# Patient Record
Sex: FEMALE | Race: WHITE | Employment: OTHER | URBAN - METROPOLITAN AREA
[De-identification: names, ages, dates, MRNs, and addresses within clinical notes are randomized per-mention and may not be internally consistent; named-entity substitution may affect disease eponyms.]

---

## 2023-12-12 ENCOUNTER — OFFICE VISIT (OUTPATIENT)
Dept: OBSTETRICS AND GYNECOLOGY | Facility: CLINIC | Age: 68
End: 2023-12-12
Payer: MEDICARE

## 2023-12-12 VITALS
WEIGHT: 239 LBS | SYSTOLIC BLOOD PRESSURE: 134 MMHG | DIASTOLIC BLOOD PRESSURE: 84 MMHG | BODY MASS INDEX: 43.98 KG/M2 | HEIGHT: 62 IN

## 2023-12-12 DIAGNOSIS — N95.0 POSTMENOPAUSAL BLEEDING: Primary | ICD-10-CM

## 2023-12-12 DIAGNOSIS — N81.2 INCOMPLETE UTEROVAGINAL PROLAPSE: ICD-10-CM

## 2023-12-12 PROCEDURE — 1126F AMNT PAIN NOTED NONE PRSNT: CPT | Performed by: OBSTETRICS & GYNECOLOGY

## 2023-12-12 PROCEDURE — 1036F TOBACCO NON-USER: CPT | Performed by: OBSTETRICS & GYNECOLOGY

## 2023-12-12 PROCEDURE — 1159F MED LIST DOCD IN RCRD: CPT | Performed by: OBSTETRICS & GYNECOLOGY

## 2023-12-12 PROCEDURE — 88305 TISSUE EXAM BY PATHOLOGIST: CPT | Performed by: PATHOLOGY

## 2023-12-12 PROCEDURE — 88305 TISSUE EXAM BY PATHOLOGIST: CPT

## 2023-12-12 PROCEDURE — 99213 OFFICE O/P EST LOW 20 MIN: CPT | Performed by: OBSTETRICS & GYNECOLOGY

## 2023-12-12 RX ORDER — MUPIROCIN 20 MG/G
OINTMENT TOPICAL
COMMUNITY
Start: 2021-10-05

## 2023-12-12 RX ORDER — LORATADINE 10 MG/1
TABLET ORAL
COMMUNITY
Start: 2020-11-18

## 2023-12-12 RX ORDER — MELOXICAM 15 MG/1
15 TABLET ORAL DAILY
COMMUNITY
Start: 2023-11-07

## 2023-12-12 RX ORDER — METOPROLOL TARTRATE 50 MG/1
50 TABLET ORAL 2 TIMES DAILY
COMMUNITY
Start: 2017-01-19

## 2023-12-12 RX ORDER — CITALOPRAM 20 MG/1
TABLET, FILM COATED ORAL
COMMUNITY
Start: 2017-01-19

## 2023-12-12 RX ORDER — TRAMADOL HYDROCHLORIDE 50 MG/1
TABLET ORAL
COMMUNITY
Start: 2021-06-14

## 2023-12-12 RX ORDER — FERROUS SULFATE 325(65) MG
325 TABLET, DELAYED RELEASE (ENTERIC COATED) ORAL
COMMUNITY
Start: 2020-11-18

## 2023-12-12 RX ORDER — ACETAMINOPHEN 500 MG
TABLET ORAL
COMMUNITY

## 2023-12-12 RX ORDER — LOSARTAN POTASSIUM 50 MG/1
TABLET ORAL
COMMUNITY
Start: 2021-11-22

## 2023-12-12 RX ORDER — HYDROCHLOROTHIAZIDE 25 MG/1
TABLET ORAL
COMMUNITY
Start: 2014-08-26

## 2023-12-12 ASSESSMENT — PAIN SCALES - GENERAL: PAINLEVEL: 0-NO PAIN

## 2023-12-12 NOTE — PROGRESS NOTES
"Assessment   IMPRESSIONS:    1. Postmenopausal bleeding  - discussed possible etiologies including endometrial hyperplasia/cancer, endometrial polyp, irritation on vulva/prolapse.  -small polyp noted protruding from cervix on exam which was removed and sent for pathology. May be the cause of the bleeding  - recommend pelvic ultrasound. If endometrial thickness is >4mm then return for EMB  - if thin endometrial stripe then can monitor symptoms at home and return if bleeding recurs    2. Incomplete uterovaginal prolapse  - not bothersome to patient at this time    Follow up for preventative visit or PRN    Rossy Simons MD      Subjective   Elsa Cabrera is a 68 y.o. female here for PMB    D&C ~ 3 years ago for postmenopausal bleeding.   No bleeding since that time until about 3  weeks ago.  Small amount of spotting.   Denies abdominal pain  Denies vaginal irritation             Has known apical prolapse    Past medical history, surgical history, social history, family history, medications and allergies were reviewed and edited as needed      Objective   /84   Ht 1.575 m (5' 2\")   Wt 108 kg (239 lb)   BMI 43.71 kg/m²      General:   Alert and oriented, in no acute distress               Vulva: Normal architecture without erythema, masses, or lesions.    Vagina: Normal mucosa without lesions, masses, or atrophy. No abnormal vaginal discharge.    Cervix: Cervix protrudes about 2cm past introitus with valsalva, small polyp like lesion noted protruding from external cervical os which was removed with ring forceps   Uterus: Normal mobile, non-enlarged uterus    Adnexa: Normal without masses or lesions   Pelvic Floor Apical prolapse with cervix 2cm past introitus   Psych Normal affect. Normal mood.        " n/a

## 2023-12-21 LAB
LABORATORY COMMENT REPORT: NORMAL
PATH REPORT.FINAL DX SPEC: NORMAL
PATH REPORT.GROSS SPEC: NORMAL
PATH REPORT.RELEVANT HX SPEC: NORMAL
PATH REPORT.TOTAL CANCER: NORMAL

## 2023-12-26 DIAGNOSIS — N95.0 POSTMENOPAUSAL BLEEDING: Primary | ICD-10-CM

## 2024-01-05 ENCOUNTER — ANCILLARY PROCEDURE (OUTPATIENT)
Dept: RADIOLOGY | Facility: CLINIC | Age: 69
End: 2024-01-05
Payer: MEDICARE

## 2024-01-05 DIAGNOSIS — N95.0 POSTMENOPAUSAL BLEEDING: ICD-10-CM

## 2024-01-05 PROCEDURE — 76830 TRANSVAGINAL US NON-OB: CPT | Performed by: RADIOLOGY

## 2024-01-05 PROCEDURE — 76856 US EXAM PELVIC COMPLETE: CPT | Performed by: RADIOLOGY

## 2024-01-05 PROCEDURE — 76830 TRANSVAGINAL US NON-OB: CPT

## 2024-01-08 ENCOUNTER — TELEPHONE (OUTPATIENT)
Dept: OBSTETRICS AND GYNECOLOGY | Facility: CLINIC | Age: 69
End: 2024-01-08
Payer: MEDICARE

## 2024-01-08 NOTE — TELEPHONE ENCOUNTER
Attempted to call patient about ultrasound results. No answer. No voicemail. Will try again at a later date.

## 2024-01-09 ENCOUNTER — PREP FOR PROCEDURE (OUTPATIENT)
Dept: OBSTETRICS AND GYNECOLOGY | Facility: CLINIC | Age: 69
End: 2024-01-09
Payer: MEDICARE

## 2024-01-09 DIAGNOSIS — R93.89 THICKENED ENDOMETRIUM: Primary | ICD-10-CM

## 2024-01-09 NOTE — PROGRESS NOTES
Called patient about ultrasound results showing thickened endometrial. She had a piece of polyp protruding through her cervical os that was removed at her last visit. Discussed that the polyp may have extended higher into her uterus. I recommend a hysteroscopy D&C with polypectomy if needed. Discussed R/B of the procedure and she desires to proceed. Type and screen and CBC ordered for pre-op.

## 2024-01-22 PROBLEM — R93.89 THICKENED ENDOMETRIUM: Status: ACTIVE | Noted: 2024-01-09

## 2024-02-01 RX ORDER — ASCORBIC ACID 500 MG
500 TABLET ORAL DAILY
COMMUNITY

## 2024-02-01 RX ORDER — AMLODIPINE BESYLATE 5 MG/1
5 TABLET ORAL DAILY
COMMUNITY

## 2024-02-01 RX ORDER — BISMUTH SUBSALICYLATE 262 MG
1 TABLET,CHEWABLE ORAL DAILY
COMMUNITY

## 2024-03-18 ENCOUNTER — TELEPHONE (OUTPATIENT)
Dept: OBSTETRICS AND GYNECOLOGY | Facility: CLINIC | Age: 69
End: 2024-03-18
Payer: MEDICARE

## 2024-04-29 ENCOUNTER — ANESTHESIA EVENT (OUTPATIENT)
Dept: OPERATING ROOM | Facility: CLINIC | Age: 69
End: 2024-04-29
Payer: MEDICARE

## 2024-04-29 ENCOUNTER — ANESTHESIA (OUTPATIENT)
Dept: OPERATING ROOM | Facility: CLINIC | Age: 69
End: 2024-04-29
Payer: MEDICARE

## 2024-04-29 ENCOUNTER — HOSPITAL ENCOUNTER (OUTPATIENT)
Facility: CLINIC | Age: 69
Setting detail: OUTPATIENT SURGERY
Discharge: HOME | End: 2024-04-29
Attending: OBSTETRICS & GYNECOLOGY | Admitting: OBSTETRICS & GYNECOLOGY
Payer: MEDICARE

## 2024-04-29 VITALS
BODY MASS INDEX: 42.84 KG/M2 | DIASTOLIC BLOOD PRESSURE: 69 MMHG | SYSTOLIC BLOOD PRESSURE: 169 MMHG | OXYGEN SATURATION: 95 % | HEIGHT: 62 IN | WEIGHT: 232.81 LBS | TEMPERATURE: 97.3 F | HEART RATE: 73 BPM | RESPIRATION RATE: 16 BRPM

## 2024-04-29 DIAGNOSIS — R93.89 THICKENED ENDOMETRIUM: Primary | ICD-10-CM

## 2024-04-29 PROCEDURE — 2500000004 HC RX 250 GENERAL PHARMACY W/ HCPCS (ALT 636 FOR OP/ED): Mod: MUE | Performed by: OBSTETRICS & GYNECOLOGY

## 2024-04-29 PROCEDURE — 58555 HYSTEROSCOPY DX SEP PROC: CPT | Performed by: OBSTETRICS & GYNECOLOGY

## 2024-04-29 PROCEDURE — 3700000002 HC GENERAL ANESTHESIA TIME - EACH INCREMENTAL 1 MINUTE: Performed by: OBSTETRICS & GYNECOLOGY

## 2024-04-29 PROCEDURE — 3700000001 HC GENERAL ANESTHESIA TIME - INITIAL BASE CHARGE: Performed by: OBSTETRICS & GYNECOLOGY

## 2024-04-29 PROCEDURE — 7100000010 HC PHASE TWO TIME - EACH INCREMENTAL 1 MINUTE: Performed by: OBSTETRICS & GYNECOLOGY

## 2024-04-29 PROCEDURE — 3600000003 HC OR TIME - INITIAL BASE CHARGE - PROCEDURE LEVEL THREE: Performed by: OBSTETRICS & GYNECOLOGY

## 2024-04-29 PROCEDURE — 3600000008 HC OR TIME - EACH INCREMENTAL 1 MINUTE - PROCEDURE LEVEL THREE: Performed by: OBSTETRICS & GYNECOLOGY

## 2024-04-29 PROCEDURE — 2720000007 HC OR 272 NO HCPCS: Performed by: OBSTETRICS & GYNECOLOGY

## 2024-04-29 PROCEDURE — A4217 STERILE WATER/SALINE, 500 ML: HCPCS | Mod: MUE | Performed by: OBSTETRICS & GYNECOLOGY

## 2024-04-29 PROCEDURE — 2500000004 HC RX 250 GENERAL PHARMACY W/ HCPCS (ALT 636 FOR OP/ED): Performed by: NURSE ANESTHETIST, CERTIFIED REGISTERED

## 2024-04-29 PROCEDURE — 88305 TISSUE EXAM BY PATHOLOGIST: CPT | Mod: TC,ELYLAB | Performed by: OBSTETRICS & GYNECOLOGY

## 2024-04-29 PROCEDURE — 2500000001 HC RX 250 WO HCPCS SELF ADMINISTERED DRUGS (ALT 637 FOR MEDICARE OP): Performed by: OBSTETRICS & GYNECOLOGY

## 2024-04-29 PROCEDURE — A58558 PR HYSTEROSCOPY,W/ENDO BX: Performed by: ANESTHESIOLOGY

## 2024-04-29 PROCEDURE — 88305 TISSUE EXAM BY PATHOLOGIST: CPT | Performed by: PATHOLOGY

## 2024-04-29 PROCEDURE — A58558 PR HYSTEROSCOPY,W/ENDO BX: Performed by: NURSE ANESTHETIST, CERTIFIED REGISTERED

## 2024-04-29 PROCEDURE — 2500000005 HC RX 250 GENERAL PHARMACY W/O HCPCS: Performed by: NURSE ANESTHETIST, CERTIFIED REGISTERED

## 2024-04-29 PROCEDURE — 7100000009 HC PHASE TWO TIME - INITIAL BASE CHARGE: Performed by: OBSTETRICS & GYNECOLOGY

## 2024-04-29 RX ORDER — SODIUM CHLORIDE 0.9 G/100ML
IRRIGANT IRRIGATION AS NEEDED
Status: DISCONTINUED | OUTPATIENT
Start: 2024-04-29 | End: 2024-04-29 | Stop reason: HOSPADM

## 2024-04-29 RX ORDER — ONDANSETRON HYDROCHLORIDE 2 MG/ML
INJECTION, SOLUTION INTRAVENOUS AS NEEDED
Status: DISCONTINUED | OUTPATIENT
Start: 2024-04-29 | End: 2024-04-29

## 2024-04-29 RX ORDER — LIDOCAINE HYDROCHLORIDE 20 MG/ML
INJECTION, SOLUTION INFILTRATION; PERINEURAL AS NEEDED
Status: DISCONTINUED | OUTPATIENT
Start: 2024-04-29 | End: 2024-04-29

## 2024-04-29 RX ORDER — OXYCODONE HYDROCHLORIDE 5 MG/1
5 TABLET ORAL EVERY 4 HOURS PRN
Status: DISCONTINUED | OUTPATIENT
Start: 2024-04-29 | End: 2024-04-29 | Stop reason: HOSPADM

## 2024-04-29 RX ORDER — PROPOFOL 10 MG/ML
INJECTION, EMULSION INTRAVENOUS AS NEEDED
Status: DISCONTINUED | OUTPATIENT
Start: 2024-04-29 | End: 2024-04-29

## 2024-04-29 RX ORDER — FENTANYL CITRATE 50 UG/ML
INJECTION, SOLUTION INTRAMUSCULAR; INTRAVENOUS AS NEEDED
Status: DISCONTINUED | OUTPATIENT
Start: 2024-04-29 | End: 2024-04-29

## 2024-04-29 RX ORDER — PROPOFOL 10 MG/ML
INJECTION, EMULSION INTRAVENOUS CONTINUOUS PRN
Status: DISCONTINUED | OUTPATIENT
Start: 2024-04-29 | End: 2024-04-29

## 2024-04-29 RX ORDER — ONDANSETRON HYDROCHLORIDE 2 MG/ML
4 INJECTION, SOLUTION INTRAVENOUS ONCE AS NEEDED
Status: DISCONTINUED | OUTPATIENT
Start: 2024-04-29 | End: 2024-04-29 | Stop reason: HOSPADM

## 2024-04-29 RX ORDER — SODIUM CHLORIDE, SODIUM LACTATE, POTASSIUM CHLORIDE, CALCIUM CHLORIDE 600; 310; 30; 20 MG/100ML; MG/100ML; MG/100ML; MG/100ML
INJECTION, SOLUTION INTRAVENOUS CONTINUOUS PRN
Status: DISCONTINUED | OUTPATIENT
Start: 2024-04-29 | End: 2024-04-29

## 2024-04-29 RX ORDER — LIDOCAINE IN NACL,ISO-OSMOT/PF 30 MG/3 ML
0.1 SYRINGE (ML) INJECTION ONCE
Status: DISCONTINUED | OUTPATIENT
Start: 2024-04-29 | End: 2024-04-29 | Stop reason: HOSPADM

## 2024-04-29 RX ORDER — HYDROMORPHONE HYDROCHLORIDE 1 MG/ML
0.5 INJECTION, SOLUTION INTRAMUSCULAR; INTRAVENOUS; SUBCUTANEOUS EVERY 5 MIN PRN
Status: DISCONTINUED | OUTPATIENT
Start: 2024-04-29 | End: 2024-04-29 | Stop reason: HOSPADM

## 2024-04-29 RX ORDER — GLYCOPYRROLATE 0.2 MG/ML
INJECTION INTRAMUSCULAR; INTRAVENOUS AS NEEDED
Status: DISCONTINUED | OUTPATIENT
Start: 2024-04-29 | End: 2024-04-29

## 2024-04-29 RX ORDER — SODIUM CHLORIDE, SODIUM LACTATE, POTASSIUM CHLORIDE, CALCIUM CHLORIDE 600; 310; 30; 20 MG/100ML; MG/100ML; MG/100ML; MG/100ML
100 INJECTION, SOLUTION INTRAVENOUS CONTINUOUS
Status: DISCONTINUED | OUTPATIENT
Start: 2024-04-29 | End: 2024-04-29 | Stop reason: HOSPADM

## 2024-04-29 RX ORDER — ALBUTEROL SULFATE 0.83 MG/ML
2.5 SOLUTION RESPIRATORY (INHALATION) ONCE AS NEEDED
Status: DISCONTINUED | OUTPATIENT
Start: 2024-04-29 | End: 2024-04-29 | Stop reason: HOSPADM

## 2024-04-29 RX ORDER — LABETALOL HYDROCHLORIDE 5 MG/ML
5 INJECTION, SOLUTION INTRAVENOUS ONCE AS NEEDED
Status: DISCONTINUED | OUTPATIENT
Start: 2024-04-29 | End: 2024-04-29 | Stop reason: HOSPADM

## 2024-04-29 RX ADMIN — FENTANYL CITRATE 25 MCG: 50 INJECTION, SOLUTION INTRAMUSCULAR; INTRAVENOUS at 13:53

## 2024-04-29 RX ADMIN — PROPOFOL 40 MG: 10 INJECTION, EMULSION INTRAVENOUS at 13:52

## 2024-04-29 RX ADMIN — PROPOFOL 200 MCG/KG/MIN: 10 INJECTION, EMULSION INTRAVENOUS at 13:53

## 2024-04-29 RX ADMIN — SODIUM CHLORIDE, POTASSIUM CHLORIDE, SODIUM LACTATE AND CALCIUM CHLORIDE: 600; 310; 30; 20 INJECTION, SOLUTION INTRAVENOUS at 13:42

## 2024-04-29 RX ADMIN — GLYCOPYRROLATE 0.2 MG: 0.2 INJECTION INTRAMUSCULAR; INTRAVENOUS at 14:19

## 2024-04-29 RX ADMIN — FENTANYL CITRATE 25 MCG: 50 INJECTION, SOLUTION INTRAMUSCULAR; INTRAVENOUS at 14:19

## 2024-04-29 RX ADMIN — ONDANSETRON 4 MG: 2 INJECTION INTRAMUSCULAR; INTRAVENOUS at 13:59

## 2024-04-29 RX ADMIN — GLYCOPYRROLATE 0.2 MG: 0.2 INJECTION INTRAMUSCULAR; INTRAVENOUS at 14:18

## 2024-04-29 RX ADMIN — LIDOCAINE HYDROCHLORIDE 80 MG: 20 INJECTION, SOLUTION INFILTRATION; PERINEURAL at 13:52

## 2024-04-29 SDOH — HEALTH STABILITY: MENTAL HEALTH: CURRENT SMOKER: 0

## 2024-04-29 ASSESSMENT — PAIN - FUNCTIONAL ASSESSMENT
PAIN_FUNCTIONAL_ASSESSMENT: 0-10

## 2024-04-29 ASSESSMENT — PAIN SCALES - GENERAL
PAINLEVEL_OUTOF10: 0 - NO PAIN
PAIN_LEVEL: 0
PAINLEVEL_OUTOF10: 3

## 2024-04-29 ASSESSMENT — COLUMBIA-SUICIDE SEVERITY RATING SCALE - C-SSRS
1. IN THE PAST MONTH, HAVE YOU WISHED YOU WERE DEAD OR WISHED YOU COULD GO TO SLEEP AND NOT WAKE UP?: NO
6. HAVE YOU EVER DONE ANYTHING, STARTED TO DO ANYTHING, OR PREPARED TO DO ANYTHING TO END YOUR LIFE?: NO
2. HAVE YOU ACTUALLY HAD ANY THOUGHTS OF KILLING YOURSELF?: NO

## 2024-04-29 NOTE — ANESTHESIA PREPROCEDURE EVALUATION
"Patient: Elsa Cabrera    Procedure Information       Date/Time: 04/29/24 1330    Procedure: Hysteroscopy    [place] [facility] [place] [facility]    Surgeons: Rossy Simons MD           68 y.o. female presenting with scheduled hysteroscopy D&C with possible polypectomy for postmenopausal bleeding.          Past Medical History  Medical History        Past Medical History:   Diagnosis Date    Breast cancer (Multi) 2007     left side-radiation/chemo    Hypertension      Nephrolithiasis      SVT (supraventricular tachycardia) (CMS-HCC)       ablation done and corrected per pt.            Clinical information reviewed:   Tobacco  Allergies  Meds   Med Hx  Surg Hx  OB Status  Fam Hx  Soc   Hx        NPO Detail:  No data recorded     Vitals:    04/29/24 1230   BP: 130/80   Pulse: 55   Resp: 16   Temp: 36.2 °C (97.2 °F)   SpO2: 97%       Past Surgical History:   Procedure Laterality Date    APPENDECTOMY      BREAST LUMPECTOMY Left     OTHER SURGICAL HISTORY  10/06/2021    Abscess incision and drainage-chest area per pt. \"several areas\"     Past Medical History:   Diagnosis Date    Breast cancer (Multi) 2007    left side-radiation/chemo    Hypertension     Nephrolithiasis     SVT (supraventricular tachycardia) (CMS-HCC)     ablation done and corrected per pt.     No current facility-administered medications for this encounter.  Allergies   Allergen Reactions    Adenosine Unknown     PATIENT STATES DRUG INEFFECTIVE IN EMERGENCY PER DR ERICKSON    Latex      Added based on information entered during log entry, please review and add reactions, type, and severity as needed     Social History     Tobacco Use    Smoking status: Never    Smokeless tobacco: Never   Substance Use Topics    Alcohol use: Never             NPO Detail:  No data recorded     Review of Systems    Physical Exam    Airway  Mallampati: III  TM distance: >3 FB  Neck ROM: full     Cardiovascular   Rhythm: regular  Rate: normal   "   Dental    Pulmonary - normal exam     Abdominal   (+) obese             Anesthesia Plan    History of general anesthesia?: yes  History of complications of general anesthesia?: no    ASA 2     MAC   (GA-TIVA)  The patient is not a current smoker.    intravenous induction   Anesthetic plan and risks discussed with patient.    Plan discussed with CRNA and attending.

## 2024-04-29 NOTE — DISCHARGE INSTRUCTIONS
Please follow up in 2 weeks for post op visit    May have Tylenol after: 7:15pm    TO REACH YOUR PHYSICIAN AFTER HOURS CALL  AND ASK FOR THE PHYSICIAN ON CALL    Dr. Simons  [phone] Scheduling  [phone] Office

## 2024-04-29 NOTE — ANESTHESIA POSTPROCEDURE EVALUATION
Patient: Elsa Cabrera    Procedure Summary       Date: 04/29/24 Room / Location: [facility] OR 01 / [facility]    Anesthesia Start: 1344 Anesthesia Stop: 1432    Procedure: Hysteroscopy Diagnosis:       Thickened endometrium      (Thickened endometrium [R93.89])    Surgeons: Rossy Simons MD Responsible Provider: Vikki Rivera MD    Anesthesia Type: MAC ASA Status: 2            Anesthesia Type: MAC    Vitals Value Taken Time   /77 04/29/24 1432   Temp 36 °C (96.8 °F) 04/29/24 1432   Pulse 75 04/29/24 1432   Resp 16 04/29/24 1432   SpO2 94 % 04/29/24 1432       Anesthesia Post Evaluation    Patient location during evaluation: PACU  Patient participation: complete - patient participated  Level of consciousness: awake and alert  Pain score: 0  Pain management: adequate  Airway patency: patent  Cardiovascular status: acceptable  Respiratory status: acceptable  Hydration status: acceptable  Postoperative Nausea and Vomiting: none        There were no known notable events for this encounter.

## 2024-04-29 NOTE — H&P
"History Of Present Illness  Elsa Cabrera is a 68 y.o. female presenting with scheduled hysteroscopy D&C with possible polypectomy for postmenopausal bleeding.    No new concerns today.     Past Medical History  Past Medical History:   Diagnosis Date    Breast cancer (Multi) 2007    left side-radiation/chemo    Hypertension     Nephrolithiasis     SVT (supraventricular tachycardia) (CMS-HCC)     ablation done and corrected per pt.       Surgical History  Past Surgical History:   Procedure Laterality Date    APPENDECTOMY      BREAST LUMPECTOMY Left     OTHER SURGICAL HISTORY  10/06/2021    Abscess incision and drainage-chest area per pt. \"several areas\"        Social History  She reports that she has never smoked. She has never used smokeless tobacco. She reports that she does not drink alcohol and does not use drugs.    Family History  No family history on file.     Allergies  Adenosine and Latex    Review of Systems     Physical Exam   Objective   /80   Pulse 55   Temp 36.2 °C (97.2 °F) (Temporal)   Resp 16   Ht 1.575 m (5' 2\")   Wt 106 kg (232 lb 12.9 oz)   SpO2 97%   BMI 42.58 kg/m²      General:   Alert and oriented, in no acute distress   Neck: Supple. No visible thyromegaly.    Chest RRR  CTAB   Abdomen: Soft, non-tender, without masses or organomegaly   Vulva:    Vagina:    Cervix:    Uterus:    Adnexa:    Pelvic Floor    Psych Normal affect. Normal mood.        Last Recorded Vitals  Blood pressure 130/80, pulse 55, temperature 36.2 °C (97.2 °F), temperature source Temporal, resp. rate 16, height 1.575 m (5' 2\"), weight 106 kg (232 lb 12.9 oz), SpO2 97%.    Relevant Results  IMPRESSION:  Fibroid uterus. Endometrium measures 8 mm in dual layer thickness  which is abnormal given the patient's history of postmenopausal  bleeding and requires further evaluation.     Assessment/Plan   Principal Problem:    Thickened endometrium    - Proceed with hysteroscopy D&C with possible polypectomy for " postmenopausal bleeding with thickened endometrium        Rossy Simons MD

## 2024-04-29 NOTE — OP NOTE
Date: 2024  [place] Location: [facility]    Name: Elsa Cabrera, : 1955, Age: 68 y.o., MRN: [id number], Sex: female    Diagnosis  Pre-op Diagnosis     * Thickened endometrium [R93.89] Post-op Diagnosis     * Thickened endometrium [R93.89]     Procedures  Hysteroscopy  92511 - CO HYSTEROSCOPY BX ENDOMETRIUM&/POLYPC W/WO D&C      Surgeons      * Rossy Simons - Primary    Resident/Fellow/Other Assistant:  Surgeons and Role:  * No surgeons found with a matching role *    Procedure Summary  Anesthesia: Monitor Anesthesia Care  ASA: II  Anesthesia Staff: Anesthesiologist: Vikki Rivera MD  CRNA: NYLA Valencia-CRNA  Estimated Blood Loss: <5mL  Intra-op Medications: Administrations occurring from 1330 to 1430 on 24:  * No intraprocedure medications in log *           Anesthesia Record               Intraprocedure I/O Totals          Intake    Propofol Drip 0.00 mL    The total shown is the total volume documented since Anesthesia Start was filed.    LR infusion 300.00 mL    Total Intake 300 mL          Specimen:   ID Type Source Tests Collected by Time   1 : ENDOMETRIAL CURETTINGS Tissue ENDOMETRIUM CURETTINGS SURGICAL PATHOLOGY EXAM Rossy Simons MD 2024 1406        Staff:   Circulator: America Bishop RN  Scrub Person: Pippa Jolley      Procedure Details:  The patient was seen in the preoperative area. The site of surgery was properly noted/marked if necessary per policy. The patient has been actively warmed in preoperative area. Preoperative antibiotics are not indicated. Venous thrombosis prophylaxis are not indicated.    The patient was taken to the operating room where anesthesia was administered. She was then positioned in the dorsal lithotomy position, and a bimanual exam was performed. The patient was then prepped and draped in the normal sterile fashion. Next, a speculum was placed into the vagina, and the anterior lip of the cervix was grasped with a  single-tooth tenaculum. The cervix did not require dilation. The Marcia Aveta hysteroscope was then easily inserted through the cervical canal and the interior of the uterus was examined, with findings as listed above.  Confirmation of entry into the endometrial cavity was made with visualization of one tubal ostia. The resection device was then inserted through the hysteroscope. Under direct visualization curettings from all uterine walls were collected. Good hemostasis was noted. The hysteroscope and resection device were removed from the uterus. Sharp curettage was then performed. Endometrial curettings were collected and all specimens were then sent to pathology.     After the procedure, the single-toothed tenaculum was removed. The cervix and vagina were then examined and found to be hemostatic. The speculum was removed. All counts were correct.  The specimen was sent to pathology.  The patient was taken from the operating room in stable condition after reversal of anesthesia.      Findings: Long and narrow atrophic appearing endometrial cavity. Right tubal ostia visible. Left tubal ostia not visible due to possible intrauterine adhesions.     Complications:  None; patient tolerated the procedure well.     Disposition: PACU - hemodynamically stable.  Condition: stable

## 2024-05-13 ENCOUNTER — OFFICE VISIT (OUTPATIENT)
Dept: OBSTETRICS AND GYNECOLOGY | Facility: CLINIC | Age: 69
End: 2024-05-13
Payer: MEDICARE

## 2024-05-13 VITALS
HEIGHT: 62 IN | SYSTOLIC BLOOD PRESSURE: 122 MMHG | WEIGHT: 232 LBS | BODY MASS INDEX: 42.69 KG/M2 | DIASTOLIC BLOOD PRESSURE: 70 MMHG

## 2024-05-13 DIAGNOSIS — Z98.890 STATUS POST HYSTEROSCOPY: Primary | ICD-10-CM

## 2024-05-13 PROCEDURE — 1159F MED LIST DOCD IN RCRD: CPT | Performed by: OBSTETRICS & GYNECOLOGY

## 2024-05-13 PROCEDURE — 99024 POSTOP FOLLOW-UP VISIT: CPT | Performed by: OBSTETRICS & GYNECOLOGY

## 2024-05-13 PROCEDURE — 1126F AMNT PAIN NOTED NONE PRSNT: CPT | Performed by: OBSTETRICS & GYNECOLOGY

## 2024-05-13 ASSESSMENT — PAIN SCALES - GENERAL: PAINLEVEL: 0-NO PAIN

## 2024-05-14 NOTE — PROGRESS NOTES
Post-op visit    Assessment/Plan:   1. Status post hysteroscopy  - recovered well, no concerns  - pathology reviewed with patient, benign showing polyp tissue  - reviewed findings from the procedure. One of the tubal ostia was difficult to visualize due to intra uterine adhesions. If she has another episode of postmenopausal bleeding/thickened endometrium then we can consider definitive management with hysterectomy since hysteroscopic evaluation was incomplete    Follow up: for annual exam or sooner as needed    Rossy Simons MD      HPI: Patient presenting for 2 week post op visit s/p hysteroscopy D&C for thickened endometrium     She has no complaints. She reports an uncomplicated recovery.      Objective   Vitals:    05/13/24 1209   BP: 122/70         General Alert and oriented, in no acute distress   Abdomen: Rest of exam deferred   Vulva:    Vagina:    Cervix:    Uterus:    Adnexa:      Pathology:  Endometrium, curettage:  -- Fragments of endometrium with features compatible with polyp(s).

## 2025-01-03 ENCOUNTER — APPOINTMENT (OUTPATIENT)
Dept: OBSTETRICS AND GYNECOLOGY | Facility: CLINIC | Age: 70
End: 2025-01-03
Payer: MEDICARE

## 2025-01-24 ENCOUNTER — APPOINTMENT (OUTPATIENT)
Dept: OBSTETRICS AND GYNECOLOGY | Facility: CLINIC | Age: 70
End: 2025-01-24
Payer: MEDICARE

## 2025-01-24 VITALS — SYSTOLIC BLOOD PRESSURE: 160 MMHG | WEIGHT: 243.4 LBS | BODY MASS INDEX: 44.52 KG/M2 | DIASTOLIC BLOOD PRESSURE: 92 MMHG

## 2025-01-24 DIAGNOSIS — N95.0 PMB (POSTMENOPAUSAL BLEEDING): Primary | ICD-10-CM

## 2025-01-24 DIAGNOSIS — N81.2 INCOMPLETE UTEROVAGINAL PROLAPSE: ICD-10-CM

## 2025-01-24 PROCEDURE — 1159F MED LIST DOCD IN RCRD: CPT | Performed by: OBSTETRICS & GYNECOLOGY

## 2025-01-24 PROCEDURE — 99213 OFFICE O/P EST LOW 20 MIN: CPT | Performed by: OBSTETRICS & GYNECOLOGY

## 2025-01-24 PROCEDURE — 0753T DGTZ GLS MCRSCP SLD LEVEL IV: CPT

## 2025-01-24 PROCEDURE — 88305 TISSUE EXAM BY PATHOLOGIST: CPT

## 2025-01-24 PROCEDURE — 88305 TISSUE EXAM BY PATHOLOGIST: CPT | Performed by: PATHOLOGY

## 2025-01-24 ASSESSMENT — ENCOUNTER SYMPTOMS
DEPRESSION: 0
OCCASIONAL FEELINGS OF UNSTEADINESS: 1
LOSS OF SENSATION IN FEET: 0

## 2025-01-24 NOTE — PROGRESS NOTES
Assessment     PLAN  1. PMB (postmenopausal bleeding) (Primary)  - no bleeding noted on exam today. Recommend pelvic ultrasound as well as endometrial sampling. EMB collected today - scant sample.  - Given ongoing postmenopausal bleeding, discussed consideration of definitive management with hysterectomy especially since this may be indicated for her significant prolapse. Assuming pathology is benign - Recommend consultation with urogyn to discuss options for prolapse procedures.   - US PELVIS TRANSABDOMINAL WITH TRANSVAGINAL; Future  - Surgical Pathology Exam    2. Incomplete uterovaginal prolapse  See above  - Referral to Urogynecology; Future    Please return for your next visit in 1 year or sooner as needed.    Rossy Simons MD      Subjective     Elsa Cabrera is a 69 y.o. female who is here for a problem visit  PCP = NYLA Duarte-CNP    HPI:  - Hysteroscopy in 4/2024 with benign findings though started bleeding again 2-3 months ago.   - vaginal bleeding is daily, streaking on TP, bright red  - c/o worsening prolapse, starting to bother her      PMH, PSH, FH, SH, medications and allergies reviewed and edited as needed.      Objective   BP (!) 160/92 (BP Location: Right arm, Patient Position: Sitting)   Wt 110 kg (243 lb 6.4 oz)   BMI 44.52 kg/m²      General:   Alert and oriented, in no acute distress               Vulva: Normal architecture without erythema, masses, or lesions.    Vagina: Normal mucosa without lesions, masses. No abnormal vaginal discharge. No vaginal bleeding noted   Cervix: Cervix visible at level of introitus at rest, protrudes 3-4cm past introitus with valsalva   Uterus: Normal mobile, non-enlarged uterus                  Patient ID: Elsa Cabrera is a 69 y.o. female.    Endometrial biopsy    Date/Time: 1/26/2025 4:39 PM    Performed by: Rossy Simons MD  Authorized by: Rossy Simons MD    Consent:     Consent obtained: written    Consent given by:  patient    Risks discussed: bleeding, infection and need for repeat procedure    Alternatives discussed: observation  Indications:     Indications: postmenopausal bleeding      Chronicity of post-menopausal bleeding: recurrent    Progression of post-menopausal bleeding: waxing and waning  Procedure:     Prepped with: Betadine    Tenaculum used: yes      Number of passes: 2  Findings:     Cervix: normal      Uterus depth by sound (cm): 7    Specimen collected: low volume sample collected      Patient tolerance: tolerated well, no immediate complications

## 2025-01-31 ENCOUNTER — HOSPITAL ENCOUNTER (OUTPATIENT)
Dept: RADIOLOGY | Facility: CLINIC | Age: 70
Discharge: HOME | End: 2025-01-31
Payer: MEDICARE

## 2025-01-31 DIAGNOSIS — N95.0 PMB (POSTMENOPAUSAL BLEEDING): ICD-10-CM

## 2025-01-31 PROCEDURE — 76856 US EXAM PELVIC COMPLETE: CPT

## 2025-02-06 ENCOUNTER — TELEPHONE (OUTPATIENT)
Dept: OBSTETRICS AND GYNECOLOGY | Facility: CLINIC | Age: 70
End: 2025-02-06
Payer: MEDICARE

## 2025-02-06 NOTE — TELEPHONE ENCOUNTER
Called patient. Patient verified by name and . Patient informed of her results and that they are benign/ normal. Patient has no questions or concerns at this time.  ----- Message from Rossy Simons sent at 2025  8:50 PM EST -----  Patient is not on mychart. Please call and let her know that her biopsy was benign/normal.

## 2025-02-17 ENCOUNTER — APPOINTMENT (OUTPATIENT)
Dept: OBSTETRICS AND GYNECOLOGY | Facility: CLINIC | Age: 70
End: 2025-02-17
Payer: MEDICARE

## 2025-02-17 VITALS
WEIGHT: 238 LBS | BODY MASS INDEX: 43.79 KG/M2 | DIASTOLIC BLOOD PRESSURE: 88 MMHG | SYSTOLIC BLOOD PRESSURE: 142 MMHG | HEIGHT: 62 IN

## 2025-02-17 DIAGNOSIS — N39.46 MIXED STRESS AND URGE URINARY INCONTINENCE: ICD-10-CM

## 2025-02-17 DIAGNOSIS — N81.10 POP-Q STAGE 4 CYSTOCELE: Primary | ICD-10-CM

## 2025-02-17 DIAGNOSIS — N81.2 INCOMPLETE UTEROVAGINAL PROLAPSE: ICD-10-CM

## 2025-02-17 LAB
POC APPEARANCE, URINE: CLEAR
POC BILIRUBIN, URINE: NEGATIVE
POC BLOOD, URINE: NEGATIVE
POC COLOR, URINE: YELLOW
POC GLUCOSE, URINE: NEGATIVE MG/DL
POC KETONES, URINE: ABNORMAL MG/DL
POC LEUKOCYTES, URINE: ABNORMAL
POC NITRITE,URINE: POSITIVE
POC PH, URINE: 5.5 PH
POC PROTEIN, URINE: ABNORMAL MG/DL
POC SPECIFIC GRAVITY, URINE: >=1.03
POC UROBILINOGEN, URINE: 0.2 EU/DL

## 2025-02-17 PROCEDURE — 1159F MED LIST DOCD IN RCRD: CPT | Performed by: STUDENT IN AN ORGANIZED HEALTH CARE EDUCATION/TRAINING PROGRAM

## 2025-02-17 PROCEDURE — 99214 OFFICE O/P EST MOD 30 MIN: CPT | Performed by: STUDENT IN AN ORGANIZED HEALTH CARE EDUCATION/TRAINING PROGRAM

## 2025-02-17 PROCEDURE — 81003 URINALYSIS AUTO W/O SCOPE: CPT | Performed by: STUDENT IN AN ORGANIZED HEALTH CARE EDUCATION/TRAINING PROGRAM

## 2025-02-17 PROCEDURE — 1126F AMNT PAIN NOTED NONE PRSNT: CPT | Performed by: STUDENT IN AN ORGANIZED HEALTH CARE EDUCATION/TRAINING PROGRAM

## 2025-02-17 PROCEDURE — 3008F BODY MASS INDEX DOCD: CPT | Performed by: STUDENT IN AN ORGANIZED HEALTH CARE EDUCATION/TRAINING PROGRAM

## 2025-02-17 PROCEDURE — 1036F TOBACCO NON-USER: CPT | Performed by: STUDENT IN AN ORGANIZED HEALTH CARE EDUCATION/TRAINING PROGRAM

## 2025-02-17 ASSESSMENT — PAIN SCALES - GENERAL: PAINLEVEL_OUTOF10: 0-NO PAIN

## 2025-02-17 NOTE — PROGRESS NOTES
New pt here with prolapse  PVR 20ml     Chaperone declined: Sole Cintron, CM3      Referred by: Dr. Simons     PCP  NYLA Duarte-CNP         CHIEF COMPLAINT:  prolapse         HISTORY OF PRESENT ILLNESS:  This is a  69 y.o. y.o. female who presents with prolapse, worsening. About orange-sized.    The following were reviewed to gain additional history:  External notes: Dr. Simons note 1/24/25, PMB consider definitive management with hysterectomy  Test results: EMB 1/24/25  FINAL DIAGNOSIS      Endometrial biopsy:  -- Scant superficial strips of inactive endometrial and endocervical epithelia.            Specifically, she describes the following pelvic floor symptoms:          Prolapse: Yes       - Splinting to urinate: No       - Splinting for bowel movement/stool trapping: No              Incontinence:  Yes             Urge, wearing a pad 24/7              Urinary Symptoms:       - Frequency:  Yes             # Voids:         - Nocturia: Yes             # Voids:  occasionally, but often just wakes up wet       - Urgency:  Yes       - Incomplete emptying:  No       - Hesitancy:  No       - Pain with voiding:  No       - Excessive fluid intake: No               History:       - Recurrent UTI:  No       - Hematuria:  Yes - AMH with prior cystoscopy which was negative       - Stones:  Yes - passed spontaneously       - Kidney Disease:  No                  Bowel Symptoms:       - Regular: Yes       - Diarrhea:No       - Constipation: No       - Fecal Incontinence:  No       - Flatus Incontinence:  No       - Fecal urgency:   No    Past medical and surgical hx reviewed - pertinent for   PMH HTN, SVT, nephrolithiasis, breast cancer s/p lumpectomy and chemo rx  PSH appendectomy, cysts on ovaries removed  unsure if laparoscopy, TL  Smoking history: denies        Gyn History:  - Postmenopause: Yes           Postmenopausal bleeding: Yes - see above, s/p EMB  - HRT: No  - Pap up to date: Yes -   History of  "abnormal pap: No  - Sexually active:  No  - Number of prior vaginal deliveries: 3   Number of prior operative deliveries: 0   Prior OASI? 0  - Number of prior c-sections: 0    - Mammogram up to date: Yes  - Colonoscopy up to date: Yes    OB History    No obstetric history on file.               PHYSICAL EXAMINATION:  No LMP recorded. Patient is postmenopausal.  Body mass index is 43.53 kg/m².  /88   Ht 1.575 m (5' 2\")   Wt 108 kg (238 lb)   BMI 43.53 kg/m²   General Appearance: well appearing  Neuro: Alert and oriented   HEENT: mucous membranes moist, neck supple  Resp: No respiratory distress, normal work of breathing  MSK: normal range of motion, gait appropriate    Pelvic:  Genitourinary: normal external genitalia, Bartholin's glands negative, [place]'s glands negative  Urethra: normal meatus, non-tender, no periurethral mass  Vaginal mucosa  normal  Cervix normal  Uterus normal size, non-tender, mobile  Adnexae  negative nontender, no masses  Atrophy negative    CST negative    POP-Q (in supine position):       Aa +3     Ba +6     C +6              gh 6     pb 2     tvl 9              Ap +3     Bp +6     D +5    Rectal: no hemorrhoids, fissures or masses    PVR (by Ultrasound): 20 ml     IMPRESSION AND PLAN:  Elsa Cabrera is a 69 y.o. who presents with stage 4 uterovaginal prolapse     Stage 4 uterovaginal prolapse  - reviewed risk factors, natural history and etiology of prolapse  - discussed management options for prolapse including expectant management, PFPT, pessary fitting, and surgery  - opting for pessary fitting, may consider surgery  - would offer TVH/SSLF/A/P repair if opting for surgery (recommend hyst given PMB)  - PI sheets given for pessary and SSLF today    All questions and concerns were answered and addressed.  The patient expressed understanding and agrees with the plan.     RTC for pessary fitting next available, will call back if she changes her mind and opts to pursue " surgery    2/17/2025

## 2025-02-20 LAB
APPEARANCE UR: ABNORMAL
BACTERIA #/AREA URNS HPF: ABNORMAL /HPF
BACTERIA UR CULT: ABNORMAL
BILIRUB UR QL STRIP: NEGATIVE
CAOX CRY #/AREA URNS HPF: ABNORMAL /HPF
COLOR UR: ABNORMAL
GLUCOSE UR QL STRIP: NEGATIVE
HGB UR QL STRIP: NEGATIVE
HYALINE CASTS #/AREA URNS LPF: ABNORMAL /LPF
KETONES UR QL STRIP: ABNORMAL
LEUKOCYTE ESTERASE UR QL STRIP: ABNORMAL
NITRITE UR QL STRIP: POSITIVE
PH UR STRIP: ABNORMAL [PH] (ref 5–8)
PROT UR QL STRIP: ABNORMAL
RBC #/AREA URNS HPF: ABNORMAL /HPF
SERVICE CMNT-IMP: ABNORMAL
SP GR UR STRIP: 1.02 (ref 1–1.03)
SQUAMOUS #/AREA URNS HPF: ABNORMAL /HPF
WBC #/AREA URNS HPF: ABNORMAL /HPF

## 2025-02-26 ENCOUNTER — TELEPHONE (OUTPATIENT)
Dept: OBSTETRICS AND GYNECOLOGY | Facility: CLINIC | Age: 70
End: 2025-02-26
Payer: MEDICARE

## 2025-02-27 NOTE — TELEPHONE ENCOUNTER
2/27/25 0845 Pt is aware that an appt is needed to discuss details and sign consents to schedule surgery. She was transferred to the  for an appt.

## 2025-03-03 ENCOUNTER — APPOINTMENT (OUTPATIENT)
Dept: OBSTETRICS AND GYNECOLOGY | Facility: CLINIC | Age: 70
End: 2025-03-03
Payer: MEDICARE

## 2025-03-10 ENCOUNTER — APPOINTMENT (OUTPATIENT)
Dept: OBSTETRICS AND GYNECOLOGY | Facility: CLINIC | Age: 70
End: 2025-03-10
Payer: MEDICARE

## 2025-03-10 ENCOUNTER — PREP FOR PROCEDURE (OUTPATIENT)
Dept: OBSTETRICS AND GYNECOLOGY | Facility: CLINIC | Age: 70
End: 2025-03-10

## 2025-03-10 VITALS
WEIGHT: 237 LBS | BODY MASS INDEX: 43.61 KG/M2 | DIASTOLIC BLOOD PRESSURE: 84 MMHG | SYSTOLIC BLOOD PRESSURE: 122 MMHG | HEIGHT: 62 IN

## 2025-03-10 DIAGNOSIS — N81.10 POP-Q STAGE 4 CYSTOCELE: Primary | ICD-10-CM

## 2025-03-10 DIAGNOSIS — Z01.818 PRE-OP TESTING: ICD-10-CM

## 2025-03-10 DIAGNOSIS — N39.3 SUI (STRESS URINARY INCONTINENCE, FEMALE): ICD-10-CM

## 2025-03-10 DIAGNOSIS — N81.11 MIDLINE CYSTOCELE: Primary | ICD-10-CM

## 2025-03-10 PROCEDURE — 1159F MED LIST DOCD IN RCRD: CPT | Performed by: STUDENT IN AN ORGANIZED HEALTH CARE EDUCATION/TRAINING PROGRAM

## 2025-03-10 PROCEDURE — 1036F TOBACCO NON-USER: CPT | Performed by: STUDENT IN AN ORGANIZED HEALTH CARE EDUCATION/TRAINING PROGRAM

## 2025-03-10 PROCEDURE — 99215 OFFICE O/P EST HI 40 MIN: CPT | Performed by: STUDENT IN AN ORGANIZED HEALTH CARE EDUCATION/TRAINING PROGRAM

## 2025-03-10 PROCEDURE — 1126F AMNT PAIN NOTED NONE PRSNT: CPT | Performed by: STUDENT IN AN ORGANIZED HEALTH CARE EDUCATION/TRAINING PROGRAM

## 2025-03-10 PROCEDURE — 3008F BODY MASS INDEX DOCD: CPT | Performed by: STUDENT IN AN ORGANIZED HEALTH CARE EDUCATION/TRAINING PROGRAM

## 2025-03-10 RX ORDER — PHENAZOPYRIDINE HYDROCHLORIDE 200 MG/1
200 TABLET, FILM COATED ORAL ONCE
OUTPATIENT
Start: 2025-03-10 | End: 2025-03-10

## 2025-03-10 RX ORDER — CELECOXIB 400 MG/1
400 CAPSULE ORAL ONCE
OUTPATIENT
Start: 2025-03-10 | End: 2025-03-10

## 2025-03-10 RX ORDER — CEFAZOLIN SODIUM 2 G/100ML
2 INJECTION, SOLUTION INTRAVENOUS ONCE
OUTPATIENT
Start: 2025-03-10 | End: 2025-03-10

## 2025-03-10 RX ORDER — ACETAMINOPHEN 325 MG/1
975 TABLET ORAL ONCE
OUTPATIENT
Start: 2025-03-10 | End: 2025-03-10

## 2025-03-10 ASSESSMENT — PAIN SCALES - GENERAL: PAINLEVEL_OUTOF10: 0-NO PAIN

## 2025-03-10 NOTE — PROGRESS NOTES
"Pt here for pre op consult     Chaperone declined: ZARI Ferro      HISTORY OF PRESENT ILLNESS:  Elsa Cabrera is a 69 y.o. female, who presents in follow up for stage 4 uterovaginal prolapse      During last encounter on 2/17/25, reviewed and agreed to the following:  Stage 4 uterovaginal prolapse  - reviewed risk factors, natural history and etiology of prolapse  - discussed management options for prolapse including expectant management, PFPT, pessary fitting, and surgery  - opting for pessary fitting, may consider surgery  - would offer TVH/SSLF/A/P repair if opting for surgery (recommend hyst given PMB)  - PI sheets given for pessary and SSLF today     All questions and concerns were answered and addressed.  The patient expressed understanding and agrees with the plan.      RTC for pessary fitting next available, will call back if she changes her mind and opts to pursue surgery    Today she reports   Interested in surgery.    The following were reviewed to gain additional history:  External notes:   Test results:          PHYSICAL EXAMINATION:  No LMP recorded. Patient is postmenopausal.  Body mass index is 43.35 kg/m².  /84   Ht 1.575 m (5' 2\")   Wt 108 kg (237 lb)   BMI 43.35 kg/m²     General Appearance: well appearing  Neuro: Alert and oriented   HEENT: mucous membranes moist, neck supple  Resp: No respiratory distress, normal work of breathing  MSK: normal range of motion, gait appropriate    Pelvic: deferred    IMPRESSION AND PLAN:  Elsa Cabrera is a 69 y.o. who presents in follow up for stage 4 uterovaginal prolapse     POP  - Given large prolapse recommend TVH/SSLF to excise excess vaginal length  - Counseled on risk of postoperative urinary retention requiring temporary indwelling catheter upon discharge from hospital   - Reviewed postoperative restrictions: no lifting over 10 pounds for 6 weeks, pelvic rest for 6 weeks, and no driving for first 1-2 weeks  - Reviewed plan for " same-day surgery without overnight stay  - Will need urodynamics prior to surgery? Yes  - Will need PCP/specialist clearance? Yes (has history of SVT, HTN), PCP is Antionette Yo  - Surgical plan: TVH, possible BSO, A/P repair, possible sling, cystoscopy, location: [facility]    RTC for UDS, then visit to review results    All questions and concerns were answered and addressed.  The patient expressed understanding and agrees with the plan.     Rosalie Mederos MD

## 2025-03-10 NOTE — Clinical Note
Oliver Nicolas can you please schedule this patient for UDS then an in person follow up with me. Next available is fine. Thanks!

## 2025-03-25 ENCOUNTER — TELEPHONE (OUTPATIENT)
Dept: OBSTETRICS AND GYNECOLOGY | Facility: CLINIC | Age: 70
End: 2025-03-25
Payer: MEDICARE

## 2025-03-25 PROBLEM — N81.11 MIDLINE CYSTOCELE: Status: ACTIVE | Noted: 2025-03-10

## 2025-03-25 PROBLEM — N39.3 SUI (STRESS URINARY INCONTINENCE, FEMALE): Status: ACTIVE | Noted: 2025-03-10

## 2025-03-25 NOTE — TELEPHONE ENCOUNTER
Scheduled for surgery with Dr. Mederos 4/15 [facility]. Patient instructed to get PCP clearance. She agrees to contact office today.

## 2025-03-31 ENCOUNTER — PATIENT MESSAGE (OUTPATIENT)
Dept: UROLOGY | Facility: HOSPITAL | Age: 70
End: 2025-03-31
Payer: MEDICARE

## 2025-03-31 NOTE — PATIENT COMMUNICATION
3/31/2025  7:36 PM    Email sent to [email or web address]     Subject: Research Participants Needed at [facility] conducts medical research in order to find out how to provide the best care to our patients. We are currently looking for people to participate in a survey. Participation is always your choice. Even if you start, you can always choose to stop.    A research team in the Urogynecology department at [facility] is currently looking for people to join a survey study:    The study will look at mood changes after undergoing prolapse or incontinence surgery. A series of 5 survey packets over the 12 week period surrounding surgery will be sent to your email to gather more information about mood changes around surgery.    This project is being conducted by Dr Jesús Oliver MD, [facility], and his Female Pelvic Medicine Team. It should take approximately 15-20 minutes to complete each of the 5 total packets.    You may or may not qualify to be in this study - please click the link to find out if this study is right for you. You can also contact the study team with questions. If you know someone else who might be interested in filling out this survey contact us but please do not forward this link. Many people find value in participating in research to help others, both now and in the future.    If you are interested in learning more about this study, contact the research team at [facility], [email or web address]. We will set up a time to undergo an informed consent process.    You will get one reminder about this study. If you have questions about this research, or would prefer not to be contacted for this study, please e-mail [email or web address]. If we do not hear from you we will send 1 reminder email or follow-up with a phone call in 7 days.    To learn more about all studies at [facility] click to visit our website at: [email or web address].    All  research is approved through a special review process to protect patient safety, welfare and confidentiality. The Institutional Review Board (IRB) is a Board that is charged with protecting the rights and welfare of people who take part in research studies. The content of this message has been approved by the  IRB.

## 2025-04-03 ASSESSMENT — LIFESTYLE VARIABLES: SMOKING_STATUS: NONSMOKER

## 2025-04-03 ASSESSMENT — DUKE ACTIVITY SCORE INDEX (DASI)
CAN YOU DO MODERATE WORK AROUND THE HOUSE LIKE VACUUMING, SWEEPING FLOORS OR CARRYING GROCERIES: NO
CAN YOU RUN A SHORT DISTANCE: NO
CAN YOU DO YARD WORK LIKE RAKING LEAVES, WEEDING OR PUSHING A MOWER: NO
CAN YOU PARTICIPATE IN MODERATE RECREATIONAL ACTIVITIES LIKE GOLF, BOWLING, DANCING, DOUBLES TENNIS OR THROWING A BASEBALL OR FOOTBALL: NO
CAN YOU WALK INDOORS, SUCH AS AROUND YOUR HOUSE: YES
CAN YOU DO LIGHT WORK AROUND THE HOUSE LIKE DUSTING OR WASHING DISHES: YES
TOTAL_SCORE: 15.2
CAN YOU CLIMB A FLIGHT OF STAIRS OR WALK UP A HILL: NO
CAN YOU PARTICIPATE IN STRENOUS SPORTS LIKE SWIMMING, SINGLES TENNIS, FOOTBALL, BASKETBALL, OR SKIING: NO
CAN YOU DO HEAVY WORK AROUND THE HOUSE LIKE SCRUBBING FLOORS OR LIFTING AND MOVING HEAVY FURNITURE: NO
CAN YOU WALK A BLOCK OR TWO ON LEVEL GROUND: YES
DASI METS SCORE: 4.6
CAN YOU TAKE CARE OF YOURSELF (EAT, DRESS, BATHE, OR USE TOILET): YES
CAN YOU HAVE SEXUAL RELATIONS: YES

## 2025-04-03 ASSESSMENT — ACTIVITIES OF DAILY LIVING (ADL): ADL_SCORE: 1

## 2025-04-04 ENCOUNTER — LAB (OUTPATIENT)
Dept: LAB | Facility: HOSPITAL | Age: 70
End: 2025-04-04
Payer: MEDICARE

## 2025-04-04 ENCOUNTER — PRE-ADMISSION TESTING (OUTPATIENT)
Dept: PREADMISSION TESTING | Facility: HOSPITAL | Age: 70
End: 2025-04-04
Payer: MEDICARE

## 2025-04-04 VITALS
DIASTOLIC BLOOD PRESSURE: 76 MMHG | TEMPERATURE: 97.2 F | SYSTOLIC BLOOD PRESSURE: 147 MMHG | RESPIRATION RATE: 18 BRPM | HEART RATE: 59 BPM | WEIGHT: 244.05 LBS | HEIGHT: 61 IN | BODY MASS INDEX: 46.08 KG/M2 | OXYGEN SATURATION: 96 %

## 2025-04-04 DIAGNOSIS — Z01.818 PREOP EXAMINATION: ICD-10-CM

## 2025-04-04 DIAGNOSIS — N39.3 SUI (STRESS URINARY INCONTINENCE, FEMALE): ICD-10-CM

## 2025-04-04 DIAGNOSIS — N39.3 STRESS INCONTINENCE (FEMALE) (MALE): ICD-10-CM

## 2025-04-04 DIAGNOSIS — N81.11 CYSTOCELE, MIDLINE: ICD-10-CM

## 2025-04-04 DIAGNOSIS — Z01.818 ENCOUNTER FOR OTHER PREPROCEDURAL EXAMINATION: Primary | ICD-10-CM

## 2025-04-04 DIAGNOSIS — N81.11 MIDLINE CYSTOCELE: Primary | ICD-10-CM

## 2025-04-04 LAB
ABO GROUP (TYPE) IN BLOOD: NORMAL
ANION GAP SERPL CALC-SCNC: 10 MMOL/L (ref 10–20)
ANTIBODY SCREEN: NORMAL
APPEARANCE UR: CLEAR
ATRIAL RATE: 55 BPM
BILIRUB UR STRIP.AUTO-MCNC: NEGATIVE MG/DL
BUN SERPL-MCNC: 24 MG/DL (ref 6–23)
CALCIUM SERPL-MCNC: 9.6 MG/DL (ref 8.6–10.3)
CHLORIDE SERPL-SCNC: 105 MMOL/L (ref 98–107)
CO2 SERPL-SCNC: 32 MMOL/L (ref 21–32)
COLOR UR: YELLOW
CREAT SERPL-MCNC: 0.68 MG/DL (ref 0.5–1.05)
EGFRCR SERPLBLD CKD-EPI 2021: >90 ML/MIN/1.73M*2
ERYTHROCYTE [DISTWIDTH] IN BLOOD BY AUTOMATED COUNT: 14 % (ref 11.5–14.5)
GLUCOSE SERPL-MCNC: 98 MG/DL (ref 74–99)
GLUCOSE UR STRIP.AUTO-MCNC: NORMAL MG/DL
HCT VFR BLD AUTO: 41.5 % (ref 36–46)
HGB BLD-MCNC: 13 G/DL (ref 12–16)
HOLD SPECIMEN: NORMAL
KETONES UR STRIP.AUTO-MCNC: NEGATIVE MG/DL
LEUKOCYTE ESTERASE UR QL STRIP.AUTO: NEGATIVE
MCH RBC QN AUTO: 29.4 PG (ref 26–34)
MCHC RBC AUTO-ENTMCNC: 31.3 G/DL (ref 32–36)
MCV RBC AUTO: 94 FL (ref 80–100)
NITRITE UR QL STRIP.AUTO: NEGATIVE
NRBC BLD-RTO: 0 /100 WBCS (ref 0–0)
P AXIS: 36 DEGREES
P OFFSET: 184 MS
P ONSET: 133 MS
PH UR STRIP.AUTO: 5.5 [PH]
PLATELET # BLD AUTO: 196 X10*3/UL (ref 150–450)
POTASSIUM SERPL-SCNC: 5.3 MMOL/L (ref 3.5–5.3)
PR INTERVAL: 174 MS
PROT UR STRIP.AUTO-MCNC: NEGATIVE MG/DL
Q ONSET: 220 MS
QRS COUNT: 9 BEATS
QRS DURATION: 86 MS
QT INTERVAL: 452 MS
QTC CALCULATION(BAZETT): 432 MS
QTC FREDERICIA: 439 MS
R AXIS: 16 DEGREES
RBC # BLD AUTO: 4.42 X10*6/UL (ref 4–5.2)
RBC # UR STRIP.AUTO: NEGATIVE MG/DL
RH FACTOR (ANTIGEN D): NORMAL
SODIUM SERPL-SCNC: 142 MMOL/L (ref 136–145)
SP GR UR STRIP.AUTO: 1.02
T AXIS: 35 DEGREES
T OFFSET: 446 MS
UROBILINOGEN UR STRIP.AUTO-MCNC: NORMAL MG/DL
VENTRICULAR RATE: 55 BPM
WBC # BLD AUTO: 7.4 X10*3/UL (ref 4.4–11.3)

## 2025-04-04 PROCEDURE — 86901 BLOOD TYPING SEROLOGIC RH(D): CPT

## 2025-04-04 PROCEDURE — 85027 COMPLETE CBC AUTOMATED: CPT

## 2025-04-04 PROCEDURE — 93005 ELECTROCARDIOGRAM TRACING: CPT

## 2025-04-04 PROCEDURE — 80048 BASIC METABOLIC PNL TOTAL CA: CPT

## 2025-04-04 PROCEDURE — 86900 BLOOD TYPING SEROLOGIC ABO: CPT

## 2025-04-04 PROCEDURE — 93010 ELECTROCARDIOGRAM REPORT: CPT | Performed by: INTERNAL MEDICINE

## 2025-04-04 PROCEDURE — 81003 URINALYSIS AUTO W/O SCOPE: CPT

## 2025-04-04 PROCEDURE — 87081 CULTURE SCREEN ONLY: CPT | Mod: STJLAB

## 2025-04-04 PROCEDURE — 86850 RBC ANTIBODY SCREEN: CPT

## 2025-04-04 PROCEDURE — 99202 OFFICE O/P NEW SF 15 MIN: CPT

## 2025-04-04 RX ORDER — CHLORHEXIDINE GLUCONATE ORAL RINSE 1.2 MG/ML
SOLUTION DENTAL
Qty: 473 ML | Refills: 0 | Status: SHIPPED | OUTPATIENT
Start: 2025-04-04

## 2025-04-04 ASSESSMENT — PAIN SCALES - GENERAL: PAINLEVEL_OUTOF10: 0 - NO PAIN

## 2025-04-04 ASSESSMENT — PAIN - FUNCTIONAL ASSESSMENT: PAIN_FUNCTIONAL_ASSESSMENT: 0-10

## 2025-04-04 NOTE — PREPROCEDURE INSTRUCTIONS
Medication List            Accurate as of April 4, 2025 11:10 AM. Always use your most recent med list.                amLODIPine 5 mg tablet  Commonly known as: Norvasc  Medication Adjustments for Surgery: Take/Use as prescribed     ascorbic acid 500 mg tablet  Commonly known as: Vitamin C  Additional Medication Adjustments for Surgery: Take last dose 7 days before surgery     chlorhexidine 0.12 % solution  Commonly known as: Peridex  15 milliliter(s) orally once a day for 2 doses 15 ml  the night before surgery and 15 ml morning of surgery - swish for 30 seconds -DO NOT SWALLOW, SPIT OUT     citalopram 20 mg tablet  Commonly known as: CeleXA  Medication Adjustments for Surgery: Take/Use as prescribed     Claritin 10 mg tablet  Generic drug: loratadine  Medication Adjustments for Surgery: Take/Use as prescribed     ferrous sulfate 325 (65 Fe) mg EC tablet  Medication Adjustments for Surgery: Do Not take on the morning of surgery     hydroCHLOROthiazide 25 mg tablet  Commonly known as: HYDRODiuril  Medication Adjustments for Surgery: Take/Use as prescribed     losartan 50 mg tablet  Commonly known as: Cozaar  Additional Medication Adjustments for Surgery: Other (Comment)  Notes to patient: HOLD any evening dose the night before the day of surgery  HOLD the day of surgery     meloxicam 15 mg tablet  Commonly known as: Mobic  Additional Medication Adjustments for Surgery: Take last dose 7 days before surgery     metoprolol tartrate 50 mg tablet  Commonly known as: Lopressor  Medication Adjustments for Surgery: Take/Use as prescribed     multivitamin tablet  Additional Medication Adjustments for Surgery: Take last dose 7 days before surgery     mupirocin 2 % ointment  Commonly known as: Bactroban  Medication Adjustments for Surgery: Do Not take on the morning of surgery     traMADol 50 mg tablet  Commonly known as: Ultram  Medication Adjustments for Surgery: Take/Use as prescribed     Tylenol Extra Strength 500 mg  tablet  Generic drug: acetaminophen  Medication Adjustments for Surgery: Take/Use as prescribed     vit A,C and E-lutein-minerals 300 mcg-200 mg-27 mg-2 mg tablet  Commonly known as: Ocuvite  Additional Medication Adjustments for Surgery: Take last dose 7 days before surgery     ZINC ACETATE ORAL  Additional Medication Adjustments for Surgery: Take last dose 7 days before surgery                     PRE-OPERATIVE INSTRUCTIONS    You will receive notification one business day prior to your procedure to confirm your arrival time. It is important that you answer your phone and/or check your messages during this time. If you do not hear from the surgery center by 5 pm. the day before your procedure, please call [phone].     Please enter the building through the Outpatient entrance and take the elevator off the lobby to the 2nd floor then check in at the Outpatient Surgery desk on the 2nd floor.    INSTRUCTIONS:  Talk to your surgeon for instructions if you should stop your aspirin, blood thinner, or diabetes medicines.  DO NOT take any multivitamins or over the counter supplements for 7-10 days before surgery.  If not being admitted, you must have an adult immediately available to drive you home after surgery. We also highly recommend you have someone stay with you for the entire day and night of your surgery.  For children having surgery, a parent or legal guardian must accompany them to the surgery center. If this is not possible, please call [phone] to make additional arrangements.  For adults who are unable to consent or make medical decisions for themselves, a legal guardian or Power of  must accompany them to the surgery center. If this is not possible, please call [phone] to make additional arrangements.  Wear comfortable, loose fitting clothing.  All jewelry and piercings must be removed. If you are unable to remove an item or have a dermal piercing, please be sure to tell the nurse when  you arrive for surgery.  Nail polish and make-up must be removed.  Avoid smoking or consuming alcohol for 24 hours before surgery.  To help prevent infection, please take a shower/bath and wash your hair the night before and/or morning of surgery (or follow other specific bathing instructions provided).    Preoperative Fasting Guidelines    Why must I stop eating and drinking near surgery time?  With sedation, food or liquid in your stomach can enter your lungs causing serious complications  Increases nausea and vomiting    When do I need to stop eating and drinking before my surgery?  Do not eat any solid food after midnight the night before your surgery/procedure unless otherwise instructed by your surgeon.   You may have up to 13.5 ounces of clear liquid until TWO hours before your instructed arrival time to the hospital.  This includes water, black tea/coffee, (no milk or cream) apple juice, and electrolyte drinks (Gatorade).   You may chew gum until TWO hours before your surgery/procedure      If applicable, notify your surgeons office immediately of any new skin changes that occur to the surgical limb.      If you have any questions or concerns, please call Pre-Admission Testing at [phone].

## 2025-04-04 NOTE — PREPROCEDURE INSTRUCTIONS
Thank you for visiting Preadmission Testing at [facility]. If you have any changes to your health condition, please call the SURGEON's office to alert them and give them details of your symptoms.  STOP all NSAIDS (Ibuprofen, Motrin, Aleve), vitamins, herbals, supplements, and all over the counter medications for 7 days prior to surgery  Tylenol is okay to take up until the morning of surgery for pain or headache if needed         Preoperative Brain Exercises    What are brain exercises?  A brain exercise is any activity that engages your thinking (cognitive) skills.    What types of activities are considered brain exercises?  Jigsaw puzzles, crossword puzzles, word jumble, memory games, word search, and many more.  Many can be found free online or on your phone via a mobile dung.    Why should I do brain exercises before my surgery?  More recent research has shown brain exercise before surgery can lower the risk of postoperative delirium (confusion) which can be especially important for older adults.  Patients who did brain exercises for 5 to 10 hours the days before surgery, cut their risk of postoperative delirium in half up to 1 week after surgery.      Preoperative Deep Breathing Exercises    Why it is important to do deep breathing exercises before my surgery?  Deep breathing exercises strengthen your breathing muscles.  This helps you to recover after your surgery and decreases the chance of breathing complications.    How are the deep breathing exercises done?  Sit straight with your back supported.  Breathe in deeply and slowly through your nose. Your lower rib cage should expand and your abdomen may move forward.  Hold that breath for 3 to 5 seconds.  Breathe out through pursed lips, slowly and completely.  Rest and repeat 10 times every hour while awake.  Rest longer if you become dizzy or lightheaded.      Patient and Family Education   Ways You Can Help Prevent Blood Clots     This handout explains some simple  things you can do to help prevent blood clots.      Blood clots are blockages that can form in the body's veins. When a blood clot forms in your deep veins, it may be called a deep vein thrombosis, or DVT for short. Blood clots can happen in any part of the body where blood flows, but they are most common in the arms and legs. If a piece of a blood clot breaks free and travels to the lungs, it is called a pulmonary embolus (PE). A PE can be a very serious problem.      Being in the hospital or having surgery can raise your chances of getting a blood clot because you may not be well enough to move around as much as you normally do.      Ways you can help prevent blood clots in the hospital         Wearing SCDs. SCDs stands for Sequential Compression Devices.   SCDs are special sleeves that wrap around your legs  They attach to a pump that fills them with air to gently squeeze your legs every few minutes.   This helps return the blood in your legs to your heart.   SCDs should only be taken off when walking or bathing.   SCDs may not be comfortable, but they can help save your life.               Wearing compression stockings - if your doctor orders them. These special snug fitting stockings gently squeeze your legs to help blood flow.       Walking. Walking helps move the blood in your legs.   If your doctor says it is ok, try walking the halls at least   5 times a day. Ask us to help you get up, so you don't fall.      Taking any blood thinning medicines your doctor orders.          ©[facility]; 3/23        Ways you can help prevent blood clots at home       Wearing compression stockings - if your doctor orders them. ? Walking - to help move the blood in your legs.       Taking any blood thinning medicines your doctor orders.      Signs of a blood clot or PE      Tell your doctor or nurse know right away if you have of the problems listed below.    If you are at home, seek medical care right away. Call [phone]  for chest pain or problems breathing.          Signs of a blood clot (DVT) - such as pain,  swelling, redness or warmth in your arm or leg      Signs of a pulmonary embolism (PE) - such as chest     pain or feeling short of breath

## 2025-04-04 NOTE — CPM/PAT H&P
"CPM/PAT Evaluation       Name: Elsa Cabrera (Elsa Cabrera)  /Age: 1955/69 y.o.     In-Person       Chief Complaint: Prolapse, KEN     HPI  Pleasant 70 y/o female presents with midline cystocele, KEN scheduled for sacrospinous ligament suspension, anterior and posterior repairs, midurethral sling, cystoscopy, hysterectomy, vaginal with possible salpingo-oophorectomy on 4/15/25. She has a stage 4 uterovaginal prolapse. States she has to wear a pad all the time. States she had not been having frequent urinary tract infections. States she always has microscopic hematuria. Endorses prolapse is very irritating and frustrating to \"deal with\". Denies recent fever/illness/chills.     Past Medical History:   Diagnosis Date    Arrhythmia     Arthritis     Breast cancer     left side-radiation/chemo    Cardiac murmur     Hypertension     Joint pain     Nephrolithiasis     SVT (supraventricular tachycardia) (CMS-HCC)     ablation done and corrected per pt.    SVT (supraventricular tachycardia) (CMS-HCC)        Past Surgical History:   Procedure Laterality Date    ABLATION OF DYSRHYTHMIC FOCUS      APPENDECTOMY      BREAST LUMPECTOMY Left     DILATION AND CURETTAGE OF UTERUS      OOPHORECTOMY      OTHER SURGICAL HISTORY  10/06/2021    Abscess incision and drainage-chest area per pt. \"several areas\"       Patient  reports that she is not currently sexually active.    Family History   Problem Relation Name Age of Onset    Hypertension Mother      Hypertension Father      Heart failure Father         Allergies   Allergen Reactions    Adenosine Unknown     PATIENT STATES DRUG INEFFECTIVE IN EMERGENCY PER DR ERICKSON       Prior to Admission medications    Medication Sig Start Date End Date Taking? Authorizing Provider   acetaminophen (Tylenol Extra Strength) 500 mg tablet     Historical Provider, MD   amLODIPine (Norvasc) 5 mg tablet Take 1 tablet (5 mg) by mouth once daily.    Historical Provider, MD   ascorbic " acid (Vitamin C) 500 mg tablet Take 1 tablet (500 mg) by mouth once daily.    Historical Provider, MD   citalopram (CeleXA) 20 mg tablet  1/19/17   Historical Provider, MD   ferrous sulfate 325 (65 Fe) MG EC tablet 1 tablet. 11/18/20   Historical Provider, MD   hydroCHLOROthiazide (HYDRODiuril) 25 mg tablet Take by mouth. PRN 8/26/14   Historical Provider, MD   loratadine (Claritin) 10 mg tablet Take by mouth. 11/18/20   Historical Provider, MD   losartan (Cozaar) 50 mg tablet Take by mouth. 11/22/21   Historical Provider, MD   meloxicam (Mobic) 15 mg tablet Take 1 tablet (15 mg) by mouth once daily. Take with food. 11/7/23   Historical Provider, MD   metoprolol tartrate (Lopressor) 50 mg tablet Take 1 tablet by mouth 2 times a day. 1/19/17   Historical Provider, MD   multivitamin tablet Take 1 tablet by mouth once daily.    Historical Provider, MD   mupirocin (Bactroban) 2 % ointment Apply topically. 10/5/21   Historical Provider, MD   traMADol (Ultram) 50 mg tablet  6/14/21   Historical Provider, MD   vit A,C and E-lutein-minerals (Ocuvite) 300 mcg-200 mg-27 mg-2 mg tablet 1,000 mg. 9/23/21   Historical Provider, MD   ZINC ACETATE ORAL Take 25 mg by mouth once daily.    Historical Provider, MD        Constitutional: Negative for fever, chills, or sweats   ENMT: Negative for nasal discharge, congestion, ear pain, mouth pain, throat pain. Positive for glasses.   Respiratory: Negative for cough, wheezing, shortness of breath   Cardiac: Negative for chest pain, dyspnea on exertion, palpitations   Gastrointestinal: Negative for nausea, vomiting, diarrhea, constipation, abdominal pain  Genitourinary: Negative for dysuria, flank pain, frequency, hematuria. See HPI.   Musculoskeletal: Negative for decreased ROM, pain, swelling, weakness. Positive for bilateral knee pain.    Neurological: Negative for dizziness, confusion, headache  Psychiatric: Negative for mood changes   Skin: Negative for itching, rash,  "ulcer    Hematologic/Lymph: Negative for bruising, easy bleeding  Allergic/Immunologic: Negative itching, sneezing, swelling      Physical Exam  Vitals reviewed.   Constitutional:       Appearance: Normal appearance. She is obese.   HENT:      Head: Normocephalic.      Mouth/Throat:      Mouth: Mucous membranes are moist.      Pharynx: Oropharynx is clear.   Eyes:      Pupils: Pupils are equal, round, and reactive to light.   Cardiovascular:      Rate and Rhythm: Normal rate and regular rhythm.      Heart sounds: Murmur heard.   Pulmonary:      Effort: Pulmonary effort is normal.      Breath sounds: Normal breath sounds.   Abdominal:      General: Bowel sounds are normal.      Palpations: Abdomen is soft.   Musculoskeletal:         General: Normal range of motion.      Cervical back: Normal range of motion.   Skin:     General: Skin is warm and dry.      Comments: Rash noted under abdominal folds    Neurological:      General: No focal deficit present.      Mental Status: She is alert and oriented to person, place, and time.   Psychiatric:         Mood and Affect: Mood normal.         Behavior: Behavior normal.          PAT AIRWAY:   Airway:     Mallampati::  II    Neck ROM::  Full  normal        Testing/Diagnostic:     Patient Specialist/PCP:   PCP: Dr. Yo   Cardiology: Dr. Bowman     Visit Vitals  /76   Pulse 59   Temp 36.2 °C (97.2 °F) (Temporal)   Resp 18   Ht 1.549 m (5' 1\")   Wt 111 kg (244 lb 0.8 oz)   SpO2 96%   BMI 46.11 kg/m²   OB Status Postmenopausal   Smoking Status Never   BSA 2.19 m²       DASI Risk Score      Flowsheet Row Pre-Admission Testing from [facility]/4/2025 in [facility]   Can you take care of yourself (eat, dress, bathe, or use toilet)?  2.75 filed at 04/03/2025 1301   Can you walk indoors, such as around your house? 1.75 filed at 04/03/2025 1301   Can you walk a block or two on level ground?  2.75 filed at 04/03/2025 1301   Can you climb a flight of stairs or walk up a " hill? 0 filed at 04/03/2025 1301   Can you run a short distance? 0 filed at 04/03/2025 1301   Can you do light work around the house like dusting or washing dishes? 2.7 filed at 04/03/2025 1301   Can you do moderate work around the house like vacuuming, sweeping floors or carrying groceries? 0 filed at 04/03/2025 1301   Can you do heavy work around the house like scrubbing floors or lifting and moving heavy furniture?  0 filed at 04/03/2025 1301   Can you do yard work like raking leaves, weeding or pushing a mower? 0 filed at 04/03/2025 1301   Can you have sexual relations? 5.25 filed at 04/03/2025 1301   Can you participate in moderate recreational activities like golf, bowling, dancing, doubles tennis or throwing a baseball or football? 0 filed at 04/03/2025 1301   Can you participate in strenous sports like swimming, singles tennis, football, basketball, or skiing? 0 filed at 04/03/2025 1301   DASI SCORE 15.2 filed at 04/03/2025 1301   METS Score (Will be calculated only when all the questions are answered) 4.6 filed at 04/03/2025 1301          Caprini DVT Assessment      Flowsheet Row Pre-Admission Testing from 4/4/2025 in [facility]   DVT Score (IF A SCORE IS NOT CALCULATING, MUST SELECT A BMI TO COMPLETE) 12 filed at 04/03/2025 1300   Medical Factors Present cancer, chemotherapy, or previous malignancy filed at 04/03/2025 1300   Surgical Factors Major surgery planned, lasting over 3 hours filed at 04/03/2025 1300   BMI (BMI MUST BE CHOSEN) 41-50 (Morbid obesity) filed at 04/03/2025 1300          Modified Frailty Index      Flowsheet Row Pre-Admission Testing from 4/4/2025 in [facility]   Non-independent functional status (problems with dressing, bathing, personal grooming, or cooking) 0 filed at 04/03/2025 1302   History of diabetes mellitus  0 filed at 04/03/2025 1302   History of COPD 0 filed at 04/03/2025 1302   History of CHF No filed at 04/03/2025 1302   History of MI 0  filed at 04/03/2025 1302   History of Percutaneous Coronary Intervention, Cardiac Surgery, or Angina 0.0909 filed at 04/03/2025 1302   Hypertension requiring the use of medication  0.0909 filed at 04/03/2025 1302   Peripheral vascular disease 0 filed at 04/03/2025 1302   Impaired sensorium (cognitive impairement or loss, clouding, or delirium) 0 filed at 04/03/2025 1302   TIA or CVA withouy residual deficit 0 filed at 04/03/2025 1302   Cerebrovascular accident with deficit 0 filed at 04/03/2025 1302   Modified Frailty Index Calculator .1818 filed at 04/03/2025 1302          [id number] Stroke Risk Points  Current as of just now        N/A 0 to 9 Points:      Last Change: N/A          The [id number] risk score (Brandy CAMPO, et al. 2009. © 2010 [employer]) quantifies the risk of stroke for a patient with atrial fibrillation. For patients without atrial fibrillation or under the age of 18 this score appears as N/A. Higher score values generally indicate higher risk of stroke.        This score is not applicable to this patient. Components are not calculated.          Revised Cardiac Risk Index      Flowsheet Row Pre-Admission Testing from 4/4/2025 in [facility]   High-Risk Surgery (Intraperitoneal, Intrathoracic,Suprainguinal vascular) 0 filed at 04/03/2025 1301   History of ischemic heart disease (History of MI, History of positive exercuse test, Current chest paint considered due to myocardial ischemia, Use of nitrate therapy, ECG with pathological Q Waves) 0 filed at 04/03/2025 1301   History of congestive heart failure (pulmonary edemia, bilateral rales or S3 gallop, Paroxysmal nocturnal dyspnea, CXR showing pulmonary vascular redistribution) 0 filed at 04/03/2025 1301   History of cerebrovascular disease (Prior TIA or stroke) 0 filed at 04/03/2025 1301   Pre-operative insulin treatment 0 filed at 04/03/2025 1301   Pre-operative creatinine>2 mg/dl 0 filed at 04/03/2025 1301    Revised Cardiac Risk Calculator 0 filed at 2025 1301          Apfel Simplified Score      Flowsheet Row Pre-Admission Testing from 2025 in [facility]   Smoking status 1 filed at 2025 1301   History of motion sickness or PONV  0 filed at 2025 1301   Use of postoperative opioids 0 filed at 2025 1301   Gender - Female 1=Yes filed at 2025 1301   Apfel Simplified Score Calculator 2 filed at 2025 1301          Risk Analysis Index Results This Encounter         4/3/2025  1302             Do you live in a place other than your own home?: 0    When did you begin living in the place you are currently residing?: Greater than one year ago    Any kidney failure, kidney not working well, or seeing a kidney doctor (nephrologist)? If yes, was this for kidney stones or another problem?: 1 Kidney stones    Any history of chronic (long-term) congestive heart failure (CHF)?: 0 No    Any shortness of breath when resting?: 0 No    In the past five years, have you been diagnosed with or treated for cancer?: No    During the last 3 months has it become difficult for you to remember things or organize your thoughts?: 0 No    Have you lost weight of 10 pounds or more in the past 3 months without trying?: 0 No    Do you have any loss of appetitie?: 0 No    Getting Around (Mobility): 1 Needs help from cane, walker, or scooter    Eatin Can plan and prepare own meals    Toiletin Can use toilet without any help    Personal Hygiene (Bathing, Hand Washing, Changing Clothes): 0 Can shower or bathe without any help    VALENCIA Cancer History: Patient does not indicate history of cancer    Total Risk Analysis Index Score Without Cancer: 24    Total Risk Analysis Index Score: 24          Stop Bang Score      Flowsheet Row Pre-Admission Testing from 2025 in [facility]   Do you snore loudly? 0 filed at 2025 1300   Do you often feel tired or fatigued after your sleep? 0  filed at 04/03/2025 1300   Has anyone ever observed you stop breathing in your sleep? 0 filed at 04/03/2025 1300   Do you have or are you being treated for high blood pressure? 1 filed at 04/03/2025 1300   Recent BMI (Calculated) 43.3 filed at 04/03/2025 1300   Is BMI greater than 35 kg/m2? 1=Yes filed at 04/03/2025 1300   Age older than 50 years old? 1=Yes filed at 04/03/2025 1300   Is your neck circumference greater than 17 inches (Male) or 16 inches (Female)? 0 filed at 04/03/2025 1300   Gender - Male 0=No filed at 04/03/2025 1300   STOP-BANG Total Score 3 filed at 04/03/2025 1300          Prodigy: High Risk  Total Score: 15              Prodigy Age Score      Prodigy Previous Opioid Use Score           ARISCAT Score for Postoperative Pulmonary Complications      Flowsheet Row Pre-Admission Testing from 4/4/2025 in [facility]   Age Calculated Score 3 filed at 04/03/2025 1303   Preoperative SpO2 0 filed at 04/03/2025 1303   Respiratory infection in the last month Either upper or lower (i.e., URI, bronchitis, pneumonia), with fever and antibiotic treatment 0 filed at 04/03/2025 1303   Preoperative anemia (Hgb less than 10 g/dl) 0 filed at 04/03/2025 1303   Surgical incision  0 filed at 04/03/2025 1303   Duration of surgery  23 filed at 04/03/2025 1303   Emergency Procedure  0 filed at 04/03/2025 1303   ARISCAT Total Score  26 filed at 04/03/2025 1303          Milligan Perioperative Risk for Myocardial Infarction or Cardiac Arrest (VICKY)      Flowsheet Row Pre-Admission Testing from 4/4/2025 in [facility]   Calculated Age Score 1.42 filed at 04/03/2025 1303   Functional Status  0 filed at 04/03/2025 1303   ASA Class  -3.29 filed at 04/03/2025 1303   Creatinine 0 filed at 04/03/2025 1303   Type of Procedure  0.76 filed at 04/03/2025 1303   VICKY Total Score  -6.36 filed at 04/03/2025 1303   VICKY % 0.17 filed at 04/03/2025 1303            Assessment and Plan:     Assessment and Plan:      Preop:   OR with Dr. Mederos on 4/15 for sacrospinous ligament suspension, anterior and posterior repairs, midurethral sling, cystoscopy, hysterectomy, vaginal with possible salpingo-oophorectomy   Labs ordered per anesthesia guidelines.   EKG obtained and enclosed. Sinus bradycardia. VR: 55 BPM      Neurologic:   The patient is at an increased risk for post operative delirium secondary to age >/=65 , decrease functional status, polypharmacy , and type and duration of surgery . Preoperative brain exercise educational handout provided to patient.  The patient is at an increased risk for perioperative stroke secondary to cardiac disease, increased age, HTN, female sex , and general anesthesia.    Cardiac:  Hypertension: Controlled with medical management   Congenital stenosis of aortic valve: Monitored by cardiology   SVT: S/P ablation   Duke Activity Status Index (DASI)  DASI Score: 15.2   MET Score: 4.6  RCRI  0 which is 3.9% 30 day risk of MACE (risk for cardiac death, nonfatal myocardial infarction, and nonfactal cardiac arrest)  VICKY score which indicates a  0.17 % risk of intraoperative or 30-day postoperative MACE    Pulmonary:   STOP-BANG score of  3. Intermediate  risk of obstructive sleep apnea.   ARISCAT:   26   points which is a intermediate (13.3%) risk of in-hospital post-op pulmonary complications     GI:  Apfel: 2 points 39% risk for post operative N/V    /Renal:   Nephrolithiasis: H/O, no acute concerns     Neuro-muscular:   Arthritis: Bilateral knees    Psychiatric:   Anxiety/depression: Stable with medication     Hematologic/oncologic:  Breast cancer: S/P left side radiation/chemo and left lumpectomy   Caprini score 12, patient at high risk for perioperative DVT. Patient provided with VTE education/handout.     Skin check:   Abdominal folds: Possible yeast-type infection. She is going to use nystatin powder and try and keep skin dry and clean. She is going to show Dr. Mederos next week at her  appt.   Patient was instructed to make surgeon aware of any skin changes/concerns prior to surgery.     Anesthesia: No history of anesthesia complications. No anesthesia concerns.      *See risk scores as previously documented

## 2025-04-06 LAB — STAPHYLOCOCCUS SPEC CULT: NORMAL

## 2025-04-07 ENCOUNTER — APPOINTMENT (OUTPATIENT)
Dept: OBSTETRICS AND GYNECOLOGY | Facility: CLINIC | Age: 70
End: 2025-04-07
Payer: MEDICARE

## 2025-04-07 DIAGNOSIS — N39.41 URGE URINARY INCONTINENCE: ICD-10-CM

## 2025-04-07 PROCEDURE — 51798 US URINE CAPACITY MEASURE: CPT | Performed by: STUDENT IN AN ORGANIZED HEALTH CARE EDUCATION/TRAINING PROGRAM

## 2025-04-07 PROCEDURE — 51797 INTRAABDOMINAL PRESSURE TEST: CPT | Performed by: STUDENT IN AN ORGANIZED HEALTH CARE EDUCATION/TRAINING PROGRAM

## 2025-04-07 PROCEDURE — 51784 ANAL/URINARY MUSCLE STUDY: CPT | Performed by: STUDENT IN AN ORGANIZED HEALTH CARE EDUCATION/TRAINING PROGRAM

## 2025-04-07 PROCEDURE — 51729 CYSTOMETROGRAM W/VP&UP: CPT | Performed by: STUDENT IN AN ORGANIZED HEALTH CARE EDUCATION/TRAINING PROGRAM

## 2025-04-07 PROCEDURE — 51741 ELECTRO-UROFLOWMETRY FIRST: CPT | Performed by: STUDENT IN AN ORGANIZED HEALTH CARE EDUCATION/TRAINING PROGRAM

## 2025-04-07 NOTE — PROGRESS NOTES
Patient ID: Elsa Cabrera is a 69 y.o. female.    Uroflowmetry    Date/Time: 4/7/2025 11:37 PM    Performed by: Sravanthi Little MA  Authorized by: Rosalie Mederos MD    Procedure discussed: discussed risks, benefits and alternatives    Chaperone present: no    Timeout: timeout called immediately prior to procedure    Position: sitting    Procedure Details     Procedure: CMG with UPP/voiding pressures, EMG, intra-abdominal voiding study and uroflow      CMG with UPP/Voiding Pressures Details:     First urge to void (mL): 122    Urgency to void (mL): 146    Bladder capacity (mL): 235    Intra-abdominal Voiding Study Details:     Rectal catheter placed to record intra-abdominal pressure: yes      Uroflow Details:     Pre-void volume (mL): 54.4    Voiding duration (sec): 15.2    Average flow rate (mL/sec): 3.7    Max flow rate (mL/sec): 5.2    Voided urine (mL): 212    Residual urine (mL): 22    Post-Procedure Details     Outcome: patient tolerated procedure well with no complications      Additional Details      Patient had d/o during the filling phase.   No stress incontinence noted.    Urodynamics Results    Uroflowmetry:   Maximum Flow Rate: 5.2 ml/s   Average Flow: 3.7 ml/s   Volume Voided: 54.4 ml   Post void residual: 50 ml    Cystometry:   First desire: 122 ml   P detrusor: 12 cm H2O   Strong desire: 148  ml   P detrusor: 7 cm H2O  Bladder Capacity: 235 ml.   End filling detrusor pressure: 69 cm H2O   Bladder sensation: hypersensitivity with reduced capacity    Detrusor activity: +DO with leak, Pdet 56    Compliance: normal  KEN: negative  Urethral pressure profile (UPP): good  Maximum urethral closure pressure: 61 cm H2O    Voiding Study:   Maximum flow: 11.5 ml/s   Average flow: 7.2 ml/s   P detrusor at peak flow: -10 cm H2O (artifact of Pabd)  Volume voided: 187.9 ml   Pattern:  bell curve   Mechanism of voiding: detrusor contraction  Detrusor contraction with void: artifact impedes interpretation    UDS  INTERPRETATION    Uroflow: low voided volume, PVR 50    CMG: normal compliance, hypersensitivity with reduced capacity, +high pressure DO, negative KEN    Voiding: normal flow pattern, detrusor pressure difficult to interpret    SUMMARY:  +DO, reduced capacity, negative KEN    Rosalie Mederos MD

## 2025-04-09 LAB
ATRIAL RATE: 55 BPM
P AXIS: 36 DEGREES
P OFFSET: 184 MS
P ONSET: 133 MS
PR INTERVAL: 174 MS
Q ONSET: 220 MS
QRS COUNT: 9 BEATS
QRS DURATION: 86 MS
QT INTERVAL: 452 MS
QTC CALCULATION(BAZETT): 432 MS
QTC FREDERICIA: 439 MS
R AXIS: 16 DEGREES
T AXIS: 35 DEGREES
T OFFSET: 446 MS
VENTRICULAR RATE: 55 BPM

## 2025-04-10 ENCOUNTER — TELEPHONE (OUTPATIENT)
Dept: OBSTETRICS AND GYNECOLOGY | Facility: CLINIC | Age: 70
End: 2025-04-10
Payer: MEDICARE

## 2025-04-10 NOTE — PROGRESS NOTES
Telemedicine Visit - Urogynecology    A telephone visit (audio only) between the patient (at the originating site) and provider (at the distant site) was utilized to provide this telehealth service  Verbal consent was obtained from Elsa Cabrera on this date 04/10/25 for a telehealth visit.  The patient's identity was confirmed and that she is located in [place]. Rosalie Mederos MD identified herself and the patient consented to a telehealth visit today.    HISTORY OF PRESENT ILLNESS:  Elsa Cabrera is a 69 y.o. female, here today for a virtual visit in follow up for stage IV uterovaginal prolapse      During last encounter on 3/10/25, reviewed and agreed to the following:  Elsa Cabrera is a 69 y.o. who presents in follow up for stage 4 uterovaginal prolapse      POP  - Given large prolapse recommend TVH/SSLF to excise excess vaginal length  - Counseled on risk of postoperative urinary retention requiring temporary indwelling catheter upon discharge from hospital   - Reviewed postoperative restrictions: no lifting over 10 pounds for 6 weeks, pelvic rest for 6 weeks, and no driving for first 1-2 weeks  - Reviewed plan for same-day surgery without overnight stay  - Will need urodynamics prior to surgery? Yes  - Will need PCP/specialist clearance? Yes (has history of SVT, HTN), PCP is Antionette Yo  - Surgical plan: TVH, possible BSO, A/P repair, possible sling, cystoscopy, location: [facility]     RTC for UDS, then visit to review results    Today she reports   Saw PCP and she wants cardiology clearance. Seeing cardiologist today and will get clearance. Otherwise ready for surgery.     The following were reviewed to gain additional history:  External notes:   Test results:   UDS INTERPRETATION  Uroflow: low voided volume, PVR 50  CMG: normal compliance, hypersensitivity with reduced capacity, +high pressure DO, negative KEN  Voiding: normal flow pattern, detrusor pressure difficult to interpret    SUMMARY:  +DO,  reduced capacity, negative KEN            IMPRESSION AND PLAN:  69 y.o. with stage 4 uterovaginal prolapse, scheduled for TVH, possible BSO, SSLF, A/P repair, sling, cysto 4/15/25    Pre-op   - Clearance from cardiology pending, message sent to [facility] office to be on the lookout  - reviewed results of UDS, +KEN  - Reviewed options for surgical management of KEN: midurethral sling versus periurethral bulking injections and steps of each procedure  - Counseled on success rates of each: sling ~95% successful and bulkamid 60-70% success  - Reviewed risks/benefits of each option. Sling carries the benefit of higher success rate but has 2 week recovery period with no lifting over 10 pounds, and involves mesh which carries a 2-4% risk of mesh exposure. Bulking carries lower success rate but has no recovery period and no mesh risk involved.  - Counseled on management of mesh exposure if that should happen: often managed with vaginal estrogen in the office and rarely requires return to OR for mesh excision  - Reviewed risk of urinary retention requiring indwelling catheter temporarily which would be removed in the office following surgery  - would like sling at time of procedure    Overall surgical plan: TVH, possible BSO, SSLF, A/P repair, sling, cysto 4/15/25      All questions and concerns were answered and addressed.  The patient expressed understanding and agrees with the plan.     Rosalie Mederos MD

## 2025-04-11 ENCOUNTER — TELEPHONE (OUTPATIENT)
Dept: UROLOGY | Facility: HOSPITAL | Age: 70
End: 2025-04-11

## 2025-04-11 ENCOUNTER — TELEMEDICINE (OUTPATIENT)
Dept: OBSTETRICS AND GYNECOLOGY | Facility: CLINIC | Age: 70
End: 2025-04-11
Payer: MEDICARE

## 2025-04-11 DIAGNOSIS — N39.3 SUI (STRESS URINARY INCONTINENCE, FEMALE): ICD-10-CM

## 2025-04-11 DIAGNOSIS — N81.11 MIDLINE CYSTOCELE: Primary | ICD-10-CM

## 2025-04-11 PROCEDURE — 1159F MED LIST DOCD IN RCRD: CPT | Performed by: STUDENT IN AN ORGANIZED HEALTH CARE EDUCATION/TRAINING PROGRAM

## 2025-04-11 PROCEDURE — 1126F AMNT PAIN NOTED NONE PRSNT: CPT | Performed by: STUDENT IN AN ORGANIZED HEALTH CARE EDUCATION/TRAINING PROGRAM

## 2025-04-11 PROCEDURE — 99214 OFFICE O/P EST MOD 30 MIN: CPT | Performed by: STUDENT IN AN ORGANIZED HEALTH CARE EDUCATION/TRAINING PROGRAM

## 2025-04-11 PROCEDURE — 1036F TOBACCO NON-USER: CPT | Performed by: STUDENT IN AN ORGANIZED HEALTH CARE EDUCATION/TRAINING PROGRAM

## 2025-04-11 SDOH — ECONOMIC STABILITY: FOOD INSECURITY: WITHIN THE PAST 12 MONTHS, YOU WORRIED THAT YOUR FOOD WOULD RUN OUT BEFORE YOU GOT MONEY TO BUY MORE.: NEVER TRUE

## 2025-04-11 SDOH — ECONOMIC STABILITY: FOOD INSECURITY: WITHIN THE PAST 12 MONTHS, THE FOOD YOU BOUGHT JUST DIDN'T LAST AND YOU DIDN'T HAVE MONEY TO GET MORE.: NEVER TRUE

## 2025-04-11 ASSESSMENT — LIFESTYLE VARIABLES
SKIP TO QUESTIONS 9-10: 1
AUDIT-C TOTAL SCORE: 1
HOW OFTEN DO YOU HAVE SIX OR MORE DRINKS ON ONE OCCASION: NEVER
HOW OFTEN DO YOU HAVE A DRINK CONTAINING ALCOHOL: MONTHLY OR LESS
HOW MANY STANDARD DRINKS CONTAINING ALCOHOL DO YOU HAVE ON A TYPICAL DAY: 1 OR 2

## 2025-04-11 ASSESSMENT — COLUMBIA-SUICIDE SEVERITY RATING SCALE - C-SSRS
2. HAVE YOU ACTUALLY HAD ANY THOUGHTS OF KILLING YOURSELF?: NO
1. IN THE PAST MONTH, HAVE YOU WISHED YOU WERE DEAD OR WISHED YOU COULD GO TO SLEEP AND NOT WAKE UP?: NO
6. HAVE YOU EVER DONE ANYTHING, STARTED TO DO ANYTHING, OR PREPARED TO DO ANYTHING TO END YOUR LIFE?: NO

## 2025-04-11 ASSESSMENT — PATIENT HEALTH QUESTIONNAIRE - PHQ9
2. FEELING DOWN, DEPRESSED OR HOPELESS: NOT AT ALL
1. LITTLE INTEREST OR PLEASURE IN DOING THINGS: NOT AT ALL
SUM OF ALL RESPONSES TO PHQ9 QUESTIONS 1 AND 2: 0

## 2025-04-11 ASSESSMENT — PAIN SCALES - GENERAL: PAINLEVEL_OUTOF10: 0-NO PAIN

## 2025-04-11 ASSESSMENT — ENCOUNTER SYMPTOMS
LOSS OF SENSATION IN FEET: 0
OCCASIONAL FEELINGS OF UNSTEADINESS: 1

## 2025-04-11 NOTE — TELEPHONE ENCOUNTER
"4/11/2025  2:14 PM    Called patient to discuss research study titled \"Mood Changes After Pelvic Organ Prolapse Surgery\". Patient not available. Message left to email [email or web address] with a convenient time frame for a return phone call.    Gem Shoemaker MD, LARISSA, BSN  URPS Fellow, PGY-5  "

## 2025-04-14 NOTE — H&P
"History Of Present Illness  Elsa Cabrera is a 69 y.o. female presenting TVH, possible BSO, SSLF, A/P repair, cysto in the setting of KEN and Stage 4 POP.    UDS with negative KEN  Past medical and surgical hx reviewed - pertinent for   PMH HTN, SVT, nephrolithiasis, breast cancer s/p lumpectomy and chemo rx  PSH appendectomy, cysts on ovaries removed  unsure if laparoscopy, TL  Smoking history: denies     Past Medical History  Past Medical History:   Diagnosis Date    Arrhythmia     Arthritis     Breast cancer 2007    left side-radiation/chemo    Cardiac murmur     Hypertension     Joint pain     Nephrolithiasis     SVT (supraventricular tachycardia) (CMS-HCC)     ablation done and corrected per pt.    SVT (supraventricular tachycardia) (CMS-HCC)        Surgical History  Past Surgical History:   Procedure Laterality Date    ABLATION OF DYSRHYTHMIC FOCUS      APPENDECTOMY      BREAST LUMPECTOMY Left     DILATION AND CURETTAGE OF UTERUS      OOPHORECTOMY      OTHER SURGICAL HISTORY  10/06/2021    Abscess incision and drainage-chest area per pt. \"several areas\"        Social History  She reports that she has never smoked. She has never used smokeless tobacco. She reports that she does not drink alcohol and does not use drugs.    Family History  Family History   Problem Relation Name Age of Onset    Hypertension Mother      Hypertension Father      Heart failure Father          Allergies  Adenosine    Review of Systems   All other systems reviewed and are negative.       Physical Exam  Constitutional:       Appearance: Normal appearance.   Cardiovascular:      Rate and Rhythm: Normal rate.   Pulmonary:      Effort: Pulmonary effort is normal.   Musculoskeletal:         General: Normal range of motion.   Skin:     General: Skin is warm and dry.   Neurological:      General: No focal deficit present.      Mental Status: She is alert.   Psychiatric:         Mood and Affect: Mood normal.         Behavior: Behavior " normal.      Last Recorded Vitals  There were no vitals taken for this visit.    Relevant Results             Assessment/Plan   Assessment & Plan  Midline cystocele    KEN (stress urinary incontinence, female)       TVH, possible BSO, SSLF, A/P repair, cysto           Amine MD Leopoldo

## 2025-04-15 ENCOUNTER — HOSPITAL ENCOUNTER (OUTPATIENT)
Facility: HOSPITAL | Age: 70
Discharge: HOME | End: 2025-04-16
Attending: STUDENT IN AN ORGANIZED HEALTH CARE EDUCATION/TRAINING PROGRAM | Admitting: STUDENT IN AN ORGANIZED HEALTH CARE EDUCATION/TRAINING PROGRAM
Payer: MEDICARE

## 2025-04-15 ENCOUNTER — ANESTHESIA (OUTPATIENT)
Dept: OPERATING ROOM | Facility: HOSPITAL | Age: 70
End: 2025-04-15
Payer: MEDICARE

## 2025-04-15 ENCOUNTER — ANESTHESIA EVENT (OUTPATIENT)
Dept: OPERATING ROOM | Facility: HOSPITAL | Age: 70
End: 2025-04-15
Payer: MEDICARE

## 2025-04-15 DIAGNOSIS — R11.2 NAUSEA AND VOMITING, UNSPECIFIED VOMITING TYPE: Primary | ICD-10-CM

## 2025-04-15 DIAGNOSIS — N39.3 SUI (STRESS URINARY INCONTINENCE, FEMALE): ICD-10-CM

## 2025-04-15 DIAGNOSIS — N81.11 MIDLINE CYSTOCELE: ICD-10-CM

## 2025-04-15 LAB
ANION GAP SERPL CALC-SCNC: 15 MMOL/L (ref 10–20)
BUN SERPL-MCNC: 26 MG/DL (ref 6–23)
CALCIUM SERPL-MCNC: 8.7 MG/DL (ref 8.6–10.3)
CHLORIDE SERPL-SCNC: 103 MMOL/L (ref 98–107)
CO2 SERPL-SCNC: 23 MMOL/L (ref 21–32)
CREAT SERPL-MCNC: 0.75 MG/DL (ref 0.5–1.05)
EGFRCR SERPLBLD CKD-EPI 2021: 85 ML/MIN/1.73M*2
ERYTHROCYTE [DISTWIDTH] IN BLOOD BY AUTOMATED COUNT: 14.3 % (ref 11.5–14.5)
GLUCOSE SERPL-MCNC: 178 MG/DL (ref 74–99)
HCT VFR BLD AUTO: 40.6 % (ref 36–46)
HGB BLD-MCNC: 12.4 G/DL (ref 12–16)
MAGNESIUM SERPL-MCNC: 2 MG/DL (ref 1.6–2.4)
MCH RBC QN AUTO: 29.3 PG (ref 26–34)
MCHC RBC AUTO-ENTMCNC: 30.5 G/DL (ref 32–36)
MCV RBC AUTO: 96 FL (ref 80–100)
NRBC BLD-RTO: 0 /100 WBCS (ref 0–0)
PLATELET # BLD AUTO: 191 X10*3/UL (ref 150–450)
POTASSIUM SERPL-SCNC: 4.8 MMOL/L (ref 3.5–5.3)
RBC # BLD AUTO: 4.23 X10*6/UL (ref 4–5.2)
SODIUM SERPL-SCNC: 136 MMOL/L (ref 136–145)
WBC # BLD AUTO: 14.3 X10*3/UL (ref 4.4–11.3)

## 2025-04-15 PROCEDURE — 88307 TISSUE EXAM BY PATHOLOGIST: CPT | Mod: TC,STJLAB | Performed by: STUDENT IN AN ORGANIZED HEALTH CARE EDUCATION/TRAINING PROGRAM

## 2025-04-15 PROCEDURE — 58262 VAG HYST INCLUDING T/O: CPT | Performed by: STUDENT IN AN ORGANIZED HEALTH CARE EDUCATION/TRAINING PROGRAM

## 2025-04-15 PROCEDURE — 99222 1ST HOSP IP/OBS MODERATE 55: CPT | Performed by: NURSE PRACTITIONER

## 2025-04-15 PROCEDURE — 7100000001 HC RECOVERY ROOM TIME - INITIAL BASE CHARGE: Performed by: STUDENT IN AN ORGANIZED HEALTH CARE EDUCATION/TRAINING PROGRAM

## 2025-04-15 PROCEDURE — 3700000002 HC GENERAL ANESTHESIA TIME - EACH INCREMENTAL 1 MINUTE: Performed by: STUDENT IN AN ORGANIZED HEALTH CARE EDUCATION/TRAINING PROGRAM

## 2025-04-15 PROCEDURE — 57250 REPAIR RECTUM & VAGINA: CPT | Performed by: STUDENT IN AN ORGANIZED HEALTH CARE EDUCATION/TRAINING PROGRAM

## 2025-04-15 PROCEDURE — 2500000004 HC RX 250 GENERAL PHARMACY W/ HCPCS (ALT 636 FOR OP/ED): Performed by: ANESTHESIOLOGIST ASSISTANT

## 2025-04-15 PROCEDURE — 57282 COLPOPEXY EXTRAPERITONEAL: CPT | Performed by: STUDENT IN AN ORGANIZED HEALTH CARE EDUCATION/TRAINING PROGRAM

## 2025-04-15 PROCEDURE — 2500000004 HC RX 250 GENERAL PHARMACY W/ HCPCS (ALT 636 FOR OP/ED): Performed by: ANESTHESIOLOGY

## 2025-04-15 PROCEDURE — 3700000001 HC GENERAL ANESTHESIA TIME - INITIAL BASE CHARGE: Performed by: STUDENT IN AN ORGANIZED HEALTH CARE EDUCATION/TRAINING PROGRAM

## 2025-04-15 PROCEDURE — 2500000001 HC RX 250 WO HCPCS SELF ADMINISTERED DRUGS (ALT 637 FOR MEDICARE OP): Performed by: STUDENT IN AN ORGANIZED HEALTH CARE EDUCATION/TRAINING PROGRAM

## 2025-04-15 PROCEDURE — C2631 REP DEV, URINARY, W/O SLING: HCPCS | Performed by: STUDENT IN AN ORGANIZED HEALTH CARE EDUCATION/TRAINING PROGRAM

## 2025-04-15 PROCEDURE — 3600000009 HC OR TIME - EACH INCREMENTAL 1 MINUTE - PROCEDURE LEVEL FOUR: Performed by: STUDENT IN AN ORGANIZED HEALTH CARE EDUCATION/TRAINING PROGRAM

## 2025-04-15 PROCEDURE — 3600000004 HC OR TIME - INITIAL BASE CHARGE - PROCEDURE LEVEL FOUR: Performed by: STUDENT IN AN ORGANIZED HEALTH CARE EDUCATION/TRAINING PROGRAM

## 2025-04-15 PROCEDURE — 80048 BASIC METABOLIC PNL TOTAL CA: CPT | Performed by: NURSE PRACTITIONER

## 2025-04-15 PROCEDURE — 88307 TISSUE EXAM BY PATHOLOGIST: CPT | Performed by: PATHOLOGY

## 2025-04-15 PROCEDURE — G0378 HOSPITAL OBSERVATION PER HR: HCPCS

## 2025-04-15 PROCEDURE — 2720000007 HC OR 272 NO HCPCS: Performed by: STUDENT IN AN ORGANIZED HEALTH CARE EDUCATION/TRAINING PROGRAM

## 2025-04-15 PROCEDURE — 83735 ASSAY OF MAGNESIUM: CPT | Performed by: NURSE PRACTITIONER

## 2025-04-15 PROCEDURE — 2500000004 HC RX 250 GENERAL PHARMACY W/ HCPCS (ALT 636 FOR OP/ED): Mod: JZ | Performed by: ANESTHESIOLOGIST ASSISTANT

## 2025-04-15 PROCEDURE — 2500000004 HC RX 250 GENERAL PHARMACY W/ HCPCS (ALT 636 FOR OP/ED): Performed by: INTERNAL MEDICINE

## 2025-04-15 PROCEDURE — 2500000005 HC RX 250 GENERAL PHARMACY W/O HCPCS: Performed by: ANESTHESIOLOGY

## 2025-04-15 PROCEDURE — 2500000004 HC RX 250 GENERAL PHARMACY W/ HCPCS (ALT 636 FOR OP/ED): Performed by: STUDENT IN AN ORGANIZED HEALTH CARE EDUCATION/TRAINING PROGRAM

## 2025-04-15 PROCEDURE — 2500000005 HC RX 250 GENERAL PHARMACY W/O HCPCS: Performed by: STUDENT IN AN ORGANIZED HEALTH CARE EDUCATION/TRAINING PROGRAM

## 2025-04-15 PROCEDURE — 85027 COMPLETE CBC AUTOMATED: CPT | Performed by: NURSE PRACTITIONER

## 2025-04-15 PROCEDURE — 36415 COLL VENOUS BLD VENIPUNCTURE: CPT | Performed by: NURSE PRACTITIONER

## 2025-04-15 PROCEDURE — 7100000002 HC RECOVERY ROOM TIME - EACH INCREMENTAL 1 MINUTE: Performed by: STUDENT IN AN ORGANIZED HEALTH CARE EDUCATION/TRAINING PROGRAM

## 2025-04-15 PROCEDURE — 2500000001 HC RX 250 WO HCPCS SELF ADMINISTERED DRUGS (ALT 637 FOR MEDICARE OP): Performed by: INTERNAL MEDICINE

## 2025-04-15 RX ORDER — ONDANSETRON HYDROCHLORIDE 2 MG/ML
INJECTION, SOLUTION INTRAVENOUS AS NEEDED
Status: DISCONTINUED | OUTPATIENT
Start: 2025-04-15 | End: 2025-04-15

## 2025-04-15 RX ORDER — METOCLOPRAMIDE 10 MG/1
10 TABLET ORAL EVERY 6 HOURS PRN
Status: DISCONTINUED | OUTPATIENT
Start: 2025-04-15 | End: 2025-04-16 | Stop reason: HOSPADM

## 2025-04-15 RX ORDER — POLYETHYLENE GLYCOL 3350 17 G/17G
17 POWDER, FOR SOLUTION ORAL DAILY PRN
Status: DISCONTINUED | OUTPATIENT
Start: 2025-04-15 | End: 2025-04-16 | Stop reason: HOSPADM

## 2025-04-15 RX ORDER — DIPHENHYDRAMINE HYDROCHLORIDE 50 MG/ML
12.5 INJECTION, SOLUTION INTRAMUSCULAR; INTRAVENOUS ONCE AS NEEDED
Status: DISCONTINUED | OUTPATIENT
Start: 2025-04-15 | End: 2025-04-15

## 2025-04-15 RX ORDER — ACETAMINOPHEN 325 MG/1
650 TABLET ORAL EVERY 4 HOURS
Status: DISCONTINUED | OUTPATIENT
Start: 2025-04-15 | End: 2025-04-16 | Stop reason: HOSPADM

## 2025-04-15 RX ORDER — OXYCODONE HYDROCHLORIDE 5 MG/1
5 TABLET ORAL EVERY 4 HOURS PRN
Status: DISCONTINUED | OUTPATIENT
Start: 2025-04-15 | End: 2025-04-16 | Stop reason: HOSPADM

## 2025-04-15 RX ORDER — ONDANSETRON 4 MG/1
4 TABLET, ORALLY DISINTEGRATING ORAL EVERY 8 HOURS PRN
Status: DISCONTINUED | OUTPATIENT
Start: 2025-04-15 | End: 2025-04-16 | Stop reason: HOSPADM

## 2025-04-15 RX ORDER — ALBUTEROL SULFATE 0.83 MG/ML
2.5 SOLUTION RESPIRATORY (INHALATION) ONCE AS NEEDED
Status: DISCONTINUED | OUTPATIENT
Start: 2025-04-15 | End: 2025-04-15

## 2025-04-15 RX ORDER — LIDOCAINE HYDROCHLORIDE 10 MG/ML
0.1 INJECTION, SOLUTION INFILTRATION; PERINEURAL ONCE
Status: DISCONTINUED | OUTPATIENT
Start: 2025-04-15 | End: 2025-04-15

## 2025-04-15 RX ORDER — CEFAZOLIN 1 G/1
INJECTION, POWDER, FOR SOLUTION INTRAVENOUS AS NEEDED
Status: DISCONTINUED | OUTPATIENT
Start: 2025-04-15 | End: 2025-04-15

## 2025-04-15 RX ORDER — PROPOFOL 10 MG/ML
INJECTION, EMULSION INTRAVENOUS AS NEEDED
Status: DISCONTINUED | OUTPATIENT
Start: 2025-04-15 | End: 2025-04-15

## 2025-04-15 RX ORDER — HYDROMORPHONE HYDROCHLORIDE 1 MG/ML
1 INJECTION, SOLUTION INTRAMUSCULAR; INTRAVENOUS; SUBCUTANEOUS EVERY 5 MIN PRN
Status: DISCONTINUED | OUTPATIENT
Start: 2025-04-15 | End: 2025-04-15

## 2025-04-15 RX ORDER — FENTANYL CITRATE 50 UG/ML
INJECTION, SOLUTION INTRAMUSCULAR; INTRAVENOUS AS NEEDED
Status: DISCONTINUED | OUTPATIENT
Start: 2025-04-15 | End: 2025-04-15

## 2025-04-15 RX ORDER — GLYCOPYRROLATE 0.2 MG/ML
INJECTION INTRAMUSCULAR; INTRAVENOUS AS NEEDED
Status: DISCONTINUED | OUTPATIENT
Start: 2025-04-15 | End: 2025-04-15

## 2025-04-15 RX ORDER — MEPERIDINE HYDROCHLORIDE 50 MG/ML
12.5 INJECTION INTRAMUSCULAR; INTRAVENOUS; SUBCUTANEOUS EVERY 10 MIN PRN
Status: DISCONTINUED | OUTPATIENT
Start: 2025-04-15 | End: 2025-04-15

## 2025-04-15 RX ORDER — ONDANSETRON HYDROCHLORIDE 2 MG/ML
4 INJECTION, SOLUTION INTRAVENOUS EVERY 8 HOURS PRN
Status: DISCONTINUED | OUTPATIENT
Start: 2025-04-15 | End: 2025-04-16 | Stop reason: HOSPADM

## 2025-04-15 RX ORDER — FENTANYL CITRATE 50 UG/ML
12.5 INJECTION, SOLUTION INTRAMUSCULAR; INTRAVENOUS EVERY 5 MIN PRN
Status: DISCONTINUED | OUTPATIENT
Start: 2025-04-15 | End: 2025-04-15

## 2025-04-15 RX ORDER — SODIUM CHLORIDE 9 MG/ML
75 INJECTION, SOLUTION INTRAVENOUS CONTINUOUS
Status: ACTIVE | OUTPATIENT
Start: 2025-04-15 | End: 2025-04-16

## 2025-04-15 RX ORDER — HYDRALAZINE HYDROCHLORIDE 20 MG/ML
5 INJECTION INTRAMUSCULAR; INTRAVENOUS EVERY 30 MIN PRN
Status: DISCONTINUED | OUTPATIENT
Start: 2025-04-15 | End: 2025-04-15

## 2025-04-15 RX ORDER — SODIUM CHLORIDE, SODIUM LACTATE, POTASSIUM CHLORIDE, CALCIUM CHLORIDE 600; 310; 30; 20 MG/100ML; MG/100ML; MG/100ML; MG/100ML
100 INJECTION, SOLUTION INTRAVENOUS CONTINUOUS
Status: DISCONTINUED | OUTPATIENT
Start: 2025-04-15 | End: 2025-04-15

## 2025-04-15 RX ORDER — ROCURONIUM BROMIDE 10 MG/ML
INJECTION, SOLUTION INTRAVENOUS AS NEEDED
Status: DISCONTINUED | OUTPATIENT
Start: 2025-04-15 | End: 2025-04-15

## 2025-04-15 RX ORDER — KETOROLAC TROMETHAMINE 30 MG/ML
INJECTION, SOLUTION INTRAMUSCULAR; INTRAVENOUS AS NEEDED
Status: DISCONTINUED | OUTPATIENT
Start: 2025-04-15 | End: 2025-04-15

## 2025-04-15 RX ORDER — ONDANSETRON HYDROCHLORIDE 2 MG/ML
4 INJECTION, SOLUTION INTRAVENOUS ONCE AS NEEDED
Status: COMPLETED | OUTPATIENT
Start: 2025-04-15 | End: 2025-04-15

## 2025-04-15 RX ORDER — ACETAMINOPHEN 325 MG/1
975 TABLET ORAL ONCE
Status: COMPLETED | OUTPATIENT
Start: 2025-04-15 | End: 2025-04-15

## 2025-04-15 RX ORDER — PHENAZOPYRIDINE HYDROCHLORIDE 100 MG/1
200 TABLET, FILM COATED ORAL ONCE
Status: COMPLETED | OUTPATIENT
Start: 2025-04-15 | End: 2025-04-15

## 2025-04-15 RX ORDER — TALC
3 POWDER (GRAM) TOPICAL NIGHTLY PRN
Status: DISCONTINUED | OUTPATIENT
Start: 2025-04-15 | End: 2025-04-16 | Stop reason: HOSPADM

## 2025-04-15 RX ORDER — CEFAZOLIN SODIUM 2 G/100ML
2 INJECTION, SOLUTION INTRAVENOUS ONCE
Status: DISCONTINUED | OUTPATIENT
Start: 2025-04-15 | End: 2025-04-15

## 2025-04-15 RX ORDER — HYDROMORPHONE HYDROCHLORIDE 1 MG/ML
INJECTION, SOLUTION INTRAMUSCULAR; INTRAVENOUS; SUBCUTANEOUS AS NEEDED
Status: DISCONTINUED | OUTPATIENT
Start: 2025-04-15 | End: 2025-04-15

## 2025-04-15 RX ORDER — ACETAMINOPHEN 325 MG/1
975 TABLET ORAL ONCE
Status: DISCONTINUED | OUTPATIENT
Start: 2025-04-15 | End: 2025-04-15

## 2025-04-15 RX ORDER — LABETALOL HYDROCHLORIDE 5 MG/ML
INJECTION, SOLUTION INTRAVENOUS AS NEEDED
Status: DISCONTINUED | OUTPATIENT
Start: 2025-04-15 | End: 2025-04-15

## 2025-04-15 RX ORDER — PANTOPRAZOLE SODIUM 40 MG/1
40 TABLET, DELAYED RELEASE ORAL
Status: DISCONTINUED | OUTPATIENT
Start: 2025-04-16 | End: 2025-04-16 | Stop reason: HOSPADM

## 2025-04-15 RX ORDER — LIDOCAINE HYDROCHLORIDE 20 MG/ML
INJECTION, SOLUTION INFILTRATION; PERINEURAL AS NEEDED
Status: DISCONTINUED | OUTPATIENT
Start: 2025-04-15 | End: 2025-04-15

## 2025-04-15 RX ORDER — CELECOXIB 200 MG/1
400 CAPSULE ORAL ONCE
Status: COMPLETED | OUTPATIENT
Start: 2025-04-15 | End: 2025-04-15

## 2025-04-15 RX ORDER — MIDAZOLAM HYDROCHLORIDE 1 MG/ML
INJECTION, SOLUTION INTRAMUSCULAR; INTRAVENOUS AS NEEDED
Status: DISCONTINUED | OUTPATIENT
Start: 2025-04-15 | End: 2025-04-15

## 2025-04-15 RX ORDER — OXYCODONE HYDROCHLORIDE 5 MG/1
5 TABLET ORAL EVERY 4 HOURS PRN
Status: DISCONTINUED | OUTPATIENT
Start: 2025-04-15 | End: 2025-04-15

## 2025-04-15 RX ORDER — PANTOPRAZOLE SODIUM 40 MG/10ML
40 INJECTION, POWDER, LYOPHILIZED, FOR SOLUTION INTRAVENOUS
Status: DISCONTINUED | OUTPATIENT
Start: 2025-04-16 | End: 2025-04-16 | Stop reason: HOSPADM

## 2025-04-15 RX ORDER — MIDAZOLAM HYDROCHLORIDE 1 MG/ML
1 INJECTION, SOLUTION INTRAMUSCULAR; INTRAVENOUS ONCE AS NEEDED
Status: DISCONTINUED | OUTPATIENT
Start: 2025-04-15 | End: 2025-04-15

## 2025-04-15 RX ORDER — METOCLOPRAMIDE HYDROCHLORIDE 5 MG/ML
10 INJECTION INTRAMUSCULAR; INTRAVENOUS EVERY 6 HOURS PRN
Status: DISCONTINUED | OUTPATIENT
Start: 2025-04-15 | End: 2025-04-16 | Stop reason: HOSPADM

## 2025-04-15 RX ORDER — NEOSTIGMINE METHYLSULFATE 1 MG/ML
INJECTION INTRAVENOUS AS NEEDED
Status: DISCONTINUED | OUTPATIENT
Start: 2025-04-15 | End: 2025-04-15

## 2025-04-15 RX ADMIN — HYDROMORPHONE HYDROCHLORIDE 0.5 MG: 1 INJECTION, SOLUTION INTRAMUSCULAR; INTRAVENOUS; SUBCUTANEOUS at 15:47

## 2025-04-15 RX ADMIN — HYDROMORPHONE HYDROCHLORIDE 0.5 MG: 1 INJECTION, SOLUTION INTRAMUSCULAR; INTRAVENOUS; SUBCUTANEOUS at 16:14

## 2025-04-15 RX ADMIN — PHENAZOPYRIDINE 200 MG: 100 TABLET ORAL at 12:18

## 2025-04-15 RX ADMIN — ROCURONIUM BROMIDE 20 MG: 10 INJECTION INTRAVENOUS at 13:23

## 2025-04-15 RX ADMIN — SODIUM CHLORIDE, POTASSIUM CHLORIDE, SODIUM LACTATE AND CALCIUM CHLORIDE: 600; 310; 30; 20 INJECTION, SOLUTION INTRAVENOUS at 15:23

## 2025-04-15 RX ADMIN — SODIUM CHLORIDE, POTASSIUM CHLORIDE, SODIUM LACTATE AND CALCIUM CHLORIDE: 600; 310; 30; 20 INJECTION, SOLUTION INTRAVENOUS at 12:28

## 2025-04-15 RX ADMIN — KETOROLAC TROMETHAMINE 30 MG: 30 INJECTION, SOLUTION INTRAMUSCULAR at 15:35

## 2025-04-15 RX ADMIN — HYDROMORPHONE HYDROCHLORIDE 0.25 MG: 1 INJECTION, SOLUTION INTRAMUSCULAR; INTRAVENOUS; SUBCUTANEOUS at 14:49

## 2025-04-15 RX ADMIN — CELECOXIB 400 MG: 200 CAPSULE ORAL at 12:18

## 2025-04-15 RX ADMIN — CEFAZOLIN 2 G: 1 INJECTION, POWDER, FOR SOLUTION INTRAMUSCULAR; INTRAVENOUS at 12:39

## 2025-04-15 RX ADMIN — FENTANYL CITRATE 100 MCG: 50 INJECTION, SOLUTION INTRAMUSCULAR; INTRAVENOUS at 12:34

## 2025-04-15 RX ADMIN — HYDROMORPHONE HYDROCHLORIDE 0.25 MG: 1 INJECTION, SOLUTION INTRAMUSCULAR; INTRAVENOUS; SUBCUTANEOUS at 14:35

## 2025-04-15 RX ADMIN — FENTANYL CITRATE 50 MCG: 50 INJECTION, SOLUTION INTRAMUSCULAR; INTRAVENOUS at 13:37

## 2025-04-15 RX ADMIN — LIDOCAINE HYDROCHLORIDE 100 MG: 20 INJECTION, SOLUTION INFILTRATION; PERINEURAL at 12:34

## 2025-04-15 RX ADMIN — LABETALOL HYDROCHLORIDE 5 MG: 5 INJECTION, SOLUTION INTRAVENOUS at 13:50

## 2025-04-15 RX ADMIN — ROCURONIUM BROMIDE 20 MG: 10 INJECTION INTRAVENOUS at 13:51

## 2025-04-15 RX ADMIN — Medication 6 L/MIN: at 15:50

## 2025-04-15 RX ADMIN — ONDANSETRON 4 MG: 2 INJECTION INTRAMUSCULAR; INTRAVENOUS at 14:56

## 2025-04-15 RX ADMIN — PROPOFOL 150 MG: 10 INJECTION, EMULSION INTRAVENOUS at 12:34

## 2025-04-15 RX ADMIN — ROCURONIUM BROMIDE 10 MG: 10 INJECTION INTRAVENOUS at 15:02

## 2025-04-15 RX ADMIN — MIDAZOLAM 2 MG: 1 INJECTION INTRAMUSCULAR; INTRAVENOUS at 12:28

## 2025-04-15 RX ADMIN — HYDROMORPHONE HYDROCHLORIDE 0.5 MG: 1 INJECTION, SOLUTION INTRAMUSCULAR; INTRAVENOUS; SUBCUTANEOUS at 15:56

## 2025-04-15 RX ADMIN — PROPOFOL 50 MG: 10 INJECTION, EMULSION INTRAVENOUS at 13:23

## 2025-04-15 RX ADMIN — LABETALOL HYDROCHLORIDE 5 MG: 5 INJECTION, SOLUTION INTRAVENOUS at 14:59

## 2025-04-15 RX ADMIN — DEXAMETHASONE SODIUM PHOSPHATE 4 MG: 4 INJECTION, SOLUTION INTRAMUSCULAR; INTRAVENOUS at 12:42

## 2025-04-15 RX ADMIN — GLYCOPYRROLATE 0.8 MG: 0.2 INJECTION, SOLUTION INTRAMUSCULAR; INTRAVENOUS at 15:16

## 2025-04-15 RX ADMIN — FENTANYL CITRATE 50 MCG: 50 INJECTION, SOLUTION INTRAMUSCULAR; INTRAVENOUS at 14:14

## 2025-04-15 RX ADMIN — ACETAMINOPHEN 975 MG: 325 TABLET, FILM COATED ORAL at 12:18

## 2025-04-15 RX ADMIN — ONDANSETRON 4 MG: 2 INJECTION INTRAMUSCULAR; INTRAVENOUS at 18:21

## 2025-04-15 RX ADMIN — NEOSTIGMINE METHYLSULFATE 5 MG: 1 INJECTION INTRAVENOUS at 15:16

## 2025-04-15 RX ADMIN — GLYCOPYRROLATE 0.2 MG: 0.2 INJECTION, SOLUTION INTRAMUSCULAR; INTRAVENOUS at 12:59

## 2025-04-15 RX ADMIN — FENTANYL CITRATE 50 MCG: 50 INJECTION, SOLUTION INTRAMUSCULAR; INTRAVENOUS at 13:26

## 2025-04-15 RX ADMIN — ACETAMINOPHEN 325MG 650 MG: 325 TABLET ORAL at 21:47

## 2025-04-15 RX ADMIN — ROCURONIUM BROMIDE 50 MG: 10 INJECTION INTRAVENOUS at 12:34

## 2025-04-15 RX ADMIN — SODIUM CHLORIDE 75 ML/HR: 9 INJECTION, SOLUTION INTRAVENOUS at 21:47

## 2025-04-15 SDOH — ECONOMIC STABILITY: INCOME INSECURITY: IN THE PAST 12 MONTHS HAS THE ELECTRIC, GAS, OIL, OR WATER COMPANY THREATENED TO SHUT OFF SERVICES IN YOUR HOME?: NO

## 2025-04-15 SDOH — ECONOMIC STABILITY: FOOD INSECURITY: WITHIN THE PAST 12 MONTHS, YOU WORRIED THAT YOUR FOOD WOULD RUN OUT BEFORE YOU GOT THE MONEY TO BUY MORE.: NEVER TRUE

## 2025-04-15 SDOH — SOCIAL STABILITY: SOCIAL INSECURITY: WITHIN THE LAST YEAR, HAVE YOU BEEN AFRAID OF YOUR PARTNER OR EX-PARTNER?: NO

## 2025-04-15 SDOH — HEALTH STABILITY: MENTAL HEALTH: HOW OFTEN DO YOU HAVE A DRINK CONTAINING ALCOHOL?: NEVER

## 2025-04-15 SDOH — SOCIAL STABILITY: SOCIAL INSECURITY: HAVE YOU HAD ANY THOUGHTS OF HARMING ANYONE ELSE?: NO

## 2025-04-15 SDOH — HEALTH STABILITY: MENTAL HEALTH: HOW MANY DRINKS CONTAINING ALCOHOL DO YOU HAVE ON A TYPICAL DAY WHEN YOU ARE DRINKING?: PATIENT DOES NOT DRINK

## 2025-04-15 SDOH — SOCIAL STABILITY: SOCIAL INSECURITY: ARE THERE ANY APPARENT SIGNS OF INJURIES/BEHAVIORS THAT COULD BE RELATED TO ABUSE/NEGLECT?: NO

## 2025-04-15 SDOH — ECONOMIC STABILITY: HOUSING INSECURITY: IN THE LAST 12 MONTHS, WAS THERE A TIME WHEN YOU WERE NOT ABLE TO PAY THE MORTGAGE OR RENT ON TIME?: NO

## 2025-04-15 SDOH — ECONOMIC STABILITY: HOUSING INSECURITY: IN THE PAST 12 MONTHS, HOW MANY TIMES HAVE YOU MOVED WHERE YOU WERE LIVING?: 0

## 2025-04-15 SDOH — SOCIAL STABILITY: SOCIAL INSECURITY: DO YOU FEEL UNSAFE GOING BACK TO THE PLACE WHERE YOU ARE LIVING?: NO

## 2025-04-15 SDOH — SOCIAL STABILITY: SOCIAL INSECURITY: DO YOU FEEL ANYONE HAS EXPLOITED OR TAKEN ADVANTAGE OF YOU FINANCIALLY OR OF YOUR PERSONAL PROPERTY?: NO

## 2025-04-15 SDOH — SOCIAL STABILITY: SOCIAL INSECURITY
WITHIN THE LAST YEAR, HAVE YOU BEEN KICKED, HIT, SLAPPED, OR OTHERWISE PHYSICALLY HURT BY YOUR PARTNER OR EX-PARTNER?: NO

## 2025-04-15 SDOH — SOCIAL STABILITY: SOCIAL INSECURITY: WITHIN THE LAST YEAR, HAVE YOU BEEN HUMILIATED OR EMOTIONALLY ABUSED IN OTHER WAYS BY YOUR PARTNER OR EX-PARTNER?: NO

## 2025-04-15 SDOH — SOCIAL STABILITY: SOCIAL INSECURITY: ABUSE: ADULT

## 2025-04-15 SDOH — SOCIAL STABILITY: SOCIAL INSECURITY: ARE YOU OR HAVE YOU BEEN THREATENED OR ABUSED PHYSICALLY, EMOTIONALLY, OR SEXUALLY BY ANYONE?: NO

## 2025-04-15 SDOH — ECONOMIC STABILITY: TRANSPORTATION INSECURITY: IN THE PAST 12 MONTHS, HAS LACK OF TRANSPORTATION KEPT YOU FROM MEDICAL APPOINTMENTS OR FROM GETTING MEDICATIONS?: NO

## 2025-04-15 SDOH — HEALTH STABILITY: MENTAL HEALTH: HOW OFTEN DO YOU HAVE SIX OR MORE DRINKS ON ONE OCCASION?: NEVER

## 2025-04-15 SDOH — ECONOMIC STABILITY: HOUSING INSECURITY: AT ANY TIME IN THE PAST 12 MONTHS, WERE YOU HOMELESS OR LIVING IN A SHELTER (INCLUDING NOW)?: NO

## 2025-04-15 SDOH — SOCIAL STABILITY: SOCIAL INSECURITY
WITHIN THE LAST YEAR, HAVE YOU BEEN RAPED OR FORCED TO HAVE ANY KIND OF SEXUAL ACTIVITY BY YOUR PARTNER OR EX-PARTNER?: NO

## 2025-04-15 SDOH — ECONOMIC STABILITY: FOOD INSECURITY: HOW HARD IS IT FOR YOU TO PAY FOR THE VERY BASICS LIKE FOOD, HOUSING, MEDICAL CARE, AND HEATING?: NOT HARD AT ALL

## 2025-04-15 SDOH — SOCIAL STABILITY: SOCIAL INSECURITY: WERE YOU ABLE TO COMPLETE ALL THE BEHAVIORAL HEALTH SCREENINGS?: YES

## 2025-04-15 SDOH — ECONOMIC STABILITY: FOOD INSECURITY: WITHIN THE PAST 12 MONTHS, THE FOOD YOU BOUGHT JUST DIDN'T LAST AND YOU DIDN'T HAVE MONEY TO GET MORE.: NEVER TRUE

## 2025-04-15 SDOH — SOCIAL STABILITY: SOCIAL INSECURITY: DOES ANYONE TRY TO KEEP YOU FROM HAVING/CONTACTING OTHER FRIENDS OR DOING THINGS OUTSIDE YOUR HOME?: NO

## 2025-04-15 SDOH — SOCIAL STABILITY: SOCIAL INSECURITY
ASK PARENT OR GUARDIAN: ARE THERE TIMES WHEN YOU, YOUR CHILD(REN), OR ANY MEMBER OF YOUR HOUSEHOLD FEEL UNSAFE, HARMED, OR THREATENED AROUND PERSONS WITH WHOM YOU KNOW OR LIVE?: NO

## 2025-04-15 SDOH — SOCIAL STABILITY: SOCIAL INSECURITY: HAS ANYONE EVER THREATENED TO HURT YOUR FAMILY OR YOUR PETS?: NO

## 2025-04-15 SDOH — SOCIAL STABILITY: SOCIAL INSECURITY: HAVE YOU HAD THOUGHTS OF HARMING ANYONE ELSE?: NO

## 2025-04-15 SDOH — HEALTH STABILITY: MENTAL HEALTH: CURRENT SMOKER: 0

## 2025-04-15 ASSESSMENT — PAIN - FUNCTIONAL ASSESSMENT
PAIN_FUNCTIONAL_ASSESSMENT: 0-10

## 2025-04-15 ASSESSMENT — ACTIVITIES OF DAILY LIVING (ADL)
TOILETING: INDEPENDENT
HEARING - RIGHT EAR: FUNCTIONAL
HEARING - LEFT EAR: FUNCTIONAL
WALKS IN HOME: NEEDS ASSISTANCE
ADEQUATE_TO_COMPLETE_ADL: YES
ASSISTIVE_DEVICE: CANE;EYEGLASSES
BATHING: INDEPENDENT
LACK_OF_TRANSPORTATION: NO
JUDGMENT_ADEQUATE_SAFELY_COMPLETE_DAILY_ACTIVITIES: YES
DRESSING YOURSELF: INDEPENDENT
GROOMING: INDEPENDENT
PATIENT'S MEMORY ADEQUATE TO SAFELY COMPLETE DAILY ACTIVITIES?: YES
FEEDING YOURSELF: INDEPENDENT
LACK_OF_TRANSPORTATION: NO

## 2025-04-15 ASSESSMENT — COGNITIVE AND FUNCTIONAL STATUS - GENERAL
DAILY ACTIVITIY SCORE: 24
CLIMB 3 TO 5 STEPS WITH RAILING: A LOT
MOBILITY SCORE: 21
PATIENT BASELINE BEDBOUND: NO
WALKING IN HOSPITAL ROOM: A LITTLE

## 2025-04-15 ASSESSMENT — PAIN SCALES - GENERAL
PAINLEVEL_OUTOF10: 5 - MODERATE PAIN
PAINLEVEL_OUTOF10: 4
PAINLEVEL_OUTOF10: 0 - NO PAIN
PAINLEVEL_OUTOF10: 5 - MODERATE PAIN
PAINLEVEL_OUTOF10: 3
PAINLEVEL_OUTOF10: 5 - MODERATE PAIN
PAINLEVEL_OUTOF10: 4
PAINLEVEL_OUTOF10: 5 - MODERATE PAIN

## 2025-04-15 ASSESSMENT — LIFESTYLE VARIABLES
SKIP TO QUESTIONS 9-10: 1
AUDIT-C TOTAL SCORE: 0

## 2025-04-15 ASSESSMENT — ENCOUNTER SYMPTOMS
MUSCULOSKELETAL NEGATIVE: 1
CARDIOVASCULAR NEGATIVE: 1
CONSTITUTIONAL NEGATIVE: 1
RESPIRATORY NEGATIVE: 1
GASTROINTESTINAL NEGATIVE: 1
NEUROLOGICAL NEGATIVE: 1
PSYCHIATRIC NEGATIVE: 1

## 2025-04-15 ASSESSMENT — PATIENT HEALTH QUESTIONNAIRE - PHQ9
1. LITTLE INTEREST OR PLEASURE IN DOING THINGS: NOT AT ALL
SUM OF ALL RESPONSES TO PHQ9 QUESTIONS 1 & 2: 0
2. FEELING DOWN, DEPRESSED OR HOPELESS: NOT AT ALL

## 2025-04-15 ASSESSMENT — COLUMBIA-SUICIDE SEVERITY RATING SCALE - C-SSRS
2. HAVE YOU ACTUALLY HAD ANY THOUGHTS OF KILLING YOURSELF?: NO
6. HAVE YOU EVER DONE ANYTHING, STARTED TO DO ANYTHING, OR PREPARED TO DO ANYTHING TO END YOUR LIFE?: NO
1. IN THE PAST MONTH, HAVE YOU WISHED YOU WERE DEAD OR WISHED YOU COULD GO TO SLEEP AND NOT WAKE UP?: NO

## 2025-04-15 NOTE — OP NOTE
sacrospinous ligament suspension, posterior repair, HYSTERECTOMY, VAGINAL, RIGHT SALPINGO-OOPHORECTOMY Operative Note     Date: 4/15/2025  OR Location: [facility] OR    Name: Elsa Cabrera, : 1955, Age: 69 y.o., MRN: [id number], Sex: female    Diagnosis  Pre-op Diagnosis      * Midline cystocele [N81.11]     * KEN (stress urinary incontinence, female) [N39.3] Post-op Diagnosis     * Midline cystocele [N81.11]     * KEN (stress urinary incontinence, female) [N39.3]     Procedures  sacrospinous ligament suspension  94079 - [place] COLPOPEXY VAGINAL EXTRAPERITONEAL APPROACH    posterior repair  93343 - [place] CMBND ANTERPOST COLPORRAPHY W/CYSTO    HYSTERECTOMY, VAGINAL, RIGHT SALPINGO-OOPHORECTOMY  00382 - [place] VAG HYST 250 GM/< W/RMVL TUBE&/OVARY      Surgeons      * Rosalie Mederos - Primary    Resident/Fellow/Other Assistant:  Surgeons and Role:     * Andrea Mina MD - Resident - Assisting     * Eliane Chamberlain MD - Fellow    Staff:   Circulator: Renetta  Surgical Assistant: Elsa Rivera Person: Gwen Rivera Person: Phyllis Toscano Circulator: Alejandra Toscano Scrub: Elly    Anesthesia Staff: Anesthesiologist: Collin Yu DO; Qasim Sanchez DO  C-AA: KIERSTEN Siegel    Procedure Summary  Anesthesia: Anesthesia type not filed in the log.  ASA: II  Estimated Blood Loss: 100 mL  Intra-op Medications:   Administrations occurring from 1310 to 1700 on 04/15/25:   Medication Name Total Dose   vasopressin (Vasostrict) 20 Units in sodium chloride (PF) 0.9% 50 mL mixture 15 mL   fentaNYL (Sublimaze) injection 50 mcg/mL 150 mcg   glycopyrrolate (Robinul) injection 0.8 mg   HYDROmorphone (Dilaudid) injection 1 mg/mL 0.5 mg   ketorolac (Toradol) injection 30 mg 30 mg   labetalol (Normodyne,Trandate) injection 10 mg   LR bolus Cannot be calculated   neostigmine (Bloxiverz) 10 mg/10 mL injection 5 mg   ondansetron (Zofran) 2 mg/mL injection 4 mg   propofol (Diprivan) injection 10 mg/mL 50 mg   rocuronium (ZeMuron) 50 mg/5  mL injection 50 mg              Anesthesia Record               Intraprocedure I/O Totals          Intake    LR bolus 1000.00 mL    Total Intake 1000 mL          Specimen:   ID Type Source Tests Collected by Time   1 : UTERUS, CERVIX, RIGHT FALLOPIAN TUBE, RIGHT OVARY Tissue UTERUS, CERVIX, FALLOPIAN TUBE AND OVARY RIGHT SURGICAL PATHOLOGY EXAM Rosalie Mederos MD 4/15/2025 1444                 Drains and/or Catheters:   Urethral Catheter Non-latex 16 Fr. (Active)       Tourniquet Times:         Implants:     Findings: see below    Indications: Elsa Cabrera is an 69 y.o. female who is having surgery for Midline cystocele [N81.11]  KEN (stress urinary incontinence, female) [N39.3].     The patient was seen in the preoperative area. The risks, benefits, complications, treatment options, non-operative alternatives, expected recovery and outcomes were discussed with the patient. The possibilities of reaction to medication, pulmonary aspiration, injury to surrounding structures, bleeding, recurrent infection, the need for additional procedures, failure to diagnose a condition, and creating a complication requiring transfusion or operation were discussed with the patient. The patient concurred with the proposed plan, giving informed consent.  The site of surgery was properly noted/marked if necessary per policy. The patient has been actively warmed in preoperative area. Preoperative antibiotics have been ordered and given within 1 hours of incision. Venous thrombosis prophylaxis have been ordered including bilateral sequential compression devices    Procedure Details:   Preoperative diagnosis:  Stage 4 uterovaginal prolapse     Postoperative diagnosis:  Stage 4 uterovaginal prolapse     Procedure:  Total vaginal hysterectomy   Right salpingo-oophorectomy   Sacrospinous ligament fixation  Cystoscopy   Posterior repair and perineorrhaphy     Attending surgeon:  Rosalie Mederos MD    Fellow:  Eliane Steel  MD    Resident:  Andrea Mina MD    Anesthesia:  GETA    Specimen: uterus, cervix, right fallopian tube and ovary    Complications: None apparent    Findings:  Uterus: small, mobile  Adnexae: unremarkable      With traction, vaginal support of the following structures with respect to the hymen was:  Cervix: +8 cm     Colpotomy revealed the following:  Fallopian tubes and ovaries right fallopian tube and ovary benign-appearing, left tube and ovary not visualized.     Cystoscopy:  Ureteral patency: demonstrated  Bladder integrity: no evidence of injury    Indication:   Symptomatic uterovaginal prolapse  Desires surgical management     Narrative:  General anesthetic was administered and the patient was placed in the dorsal lithotomy position in candy cane stirrups and prepped and draped in the normal sterile fashion.       Vaginal hysterectomy:   A  mira shaped incision was marked out with Allis clamps ensuring that they reach to the sacrospinous ligament. Vasopressin was injected circumferentially around the cervix along the lines of the mira, and the incision was made with scalpel. The vaginal epithelium was excised from this mira to the level of the external cervical os. The posterior cul-de-sac was entered with sharp dissection and the long weighted speculum was placed in the cul-de-sac. The space between the bladder and uterus was sharply dissected and entered. The uterosacral ligaments were clamped, transected, and suture ligated. The uterine arteries and cardinal ligaments were then clamped, transected, and suture ligated. The fundus was delivered posteriorly. The uteroovarian ligaments were then clamped and transected. 0 Vicryl sutures were used throughout. All pedicles were examined and hemostasis was assured.        The ovaries and fallopian tubes appeared as above    The right salpingo-oophorectomy was performed as follows. The ovary and fallopian tube were grasped with a Damari and brought  towards the midline.  The infundibulopelvic ligament was then clamped, transected, and doubly ligated with 0-Vicryl suture. The pedicles were inspected and good hemostasis was assured. The bowel was confirmed to be well away throughout this process.    Sacrospinous ligament suspension:  The pararectal space between the peritoneum and posterior vaginal wall was developed with blunt dissection without difficulty. Blunt dissection was used to open the adventitial tissues near the ischial spine. The sacrospinous ligament was clearly palpated. Two passes of a Capio ligature were placed through the right sacrospinous ligament. Each Capio suture was then attached to a loop of  #1 PDS and pulled through the ligament to establish four strands in the ligament.       The PDS sutures in the sacrospinous ligament were placed through the vaginal apex. The sutures were tied with approximation of the vaginal cuff to the sacrospinous ligament. Cystoscopy was performed with findings as described above.    Posterior repair:  A posterior repair was performed by excising a triangular-shaped wedge of posterior vaginal wall after infiltration of vasopressin. The rectovaginal fascia was reapproximated with interrupted 2-0 PDS sutures and the bulbocavernosus muscles were reapproximated with 2-0 PDS suture. The vaginal epithelium was reapproximated with a 2-0 Vicryl in a running fashion. A rectal exam was done, demonstrating normal rectum without any evidence of injury or transgression of sutures.     The Calvin catheter was left to drain. The patient tolerated the procedures well. Sponge, instrument and needle counts were correct. The patient was awakened from general anesthesia and brought to the recovery room.    I was present and scrubbed for the entire procedure and performed all key aspects of the procedure myself.    Rosalie Mederos MD    Complications:  None; patient tolerated the procedure well.    Disposition: PACU - hemodynamically  stable.  Condition: stable         Task Performed by RNFA or Surgical Assistant:  N/a          Additional Details:     Attending Attestation: I was present and scrubbed for the entire procedure.    Rosalie Mederos  Phone Number: [phone]

## 2025-04-15 NOTE — ANESTHESIA PREPROCEDURE EVALUATION
"Patient: Elsa Cabrera    Procedure Information       Date/Time: 04/15/25 1310    Procedures:       sacrospinous ligament suspension      anterior and posterior repairs      midurethral sling, cystoscopy      HYSTERECTOMY, VAGINAL, WITH POSSIBLE SALPINGO-OOPHORECTOMY    Location: [facility] OR 07 / [facility]    Surgeons: Rosalie Mederos MD          Vitals:    04/15/25 1205   BP: (!) 213/91   Pulse: 60   Resp: 20   Temp: 36.4 °C (97.5 °F)   SpO2: 99%       Past Surgical History:   Procedure Laterality Date    ABLATION OF DYSRHYTHMIC FOCUS      APPENDECTOMY      BREAST LUMPECTOMY Left     DILATION AND CURETTAGE OF UTERUS      OOPHORECTOMY      OTHER SURGICAL HISTORY  10/06/2021    Abscess incision and drainage-chest area per pt. \"several areas\"     Past Medical History:   Diagnosis Date    Arrhythmia     Arthritis     Breast cancer 2007    left side-radiation/chemo    Cardiac murmur     Hypertension     Joint pain     Nephrolithiasis     SVT (supraventricular tachycardia) (CMS-[facility])     ablation done and corrected per pt.    SVT (supraventricular tachycardia) (CMS-[facility])        Current Facility-Administered Medications:     ceFAZolin (Ancef) 2 g in dextrose (iso)  mL, 2 g, intravenous, Once, Rosalie Mederos MD  Prior to Admission medications    Medication Sig Start Date End Date Taking? Authorizing Provider   amLODIPine (Norvasc) 5 mg tablet Take 1 tablet (5 mg) by mouth once daily.   Yes Historical Provider, MD   ascorbic acid (Vitamin C) 500 mg tablet Take 1 tablet (500 mg) by mouth once daily.   Yes Historical Provider, MD   chlorhexidine (Peridex) 0.12 % solution 15 milliliter(s) orally once a day for 2 doses 15 ml  the night before surgery and 15 ml morning of surgery - swish for 30 seconds -DO NOT SWALLOW, SPIT OUT 4/4/25  Yes Liza Brantley APRN-CNP   citalopram (CeleXA) 20 mg tablet  1/19/17  Yes Historical Provider, MD   ferrous sulfate 325 (65 Fe) MG EC tablet 1 tablet. 11/18/20  Yes Historical " "Provider, MD   hydroCHLOROthiazide (HYDRODiuril) 25 mg tablet Take by mouth. PRN 8/26/14  Yes Historical Provider, MD   loratadine (Claritin) 10 mg tablet Take by mouth. 11/18/20  Yes Historical Provider, MD   losartan (Cozaar) 50 mg tablet Take by mouth. 11/22/21  Yes Historical Provider, MD   meloxicam (Mobic) 15 mg tablet Take 1 tablet (15 mg) by mouth once daily. Take with food. 11/7/23  Yes Historical Provider, MD   metoprolol tartrate (Lopressor) 50 mg tablet Take 1 tablet by mouth 2 times a day. 1/19/17  Yes Historical Provider, MD   multivitamin tablet Take 1 tablet by mouth once daily.   Yes Historical Provider, MD   traMADol (Ultram) 50 mg tablet  6/14/21  Yes Historical Provider, MD   vit A,C and E-lutein-minerals (Ocuvite) 300 mcg-200 mg-27 mg-2 mg tablet 1,000 mg. 9/23/21  Yes Historical Provider, MD   ZINC ACETATE ORAL Take 25 mg by mouth once daily.   Yes Historical Provider, MD   acetaminophen (Tylenol Extra Strength) 500 mg tablet     Historical Provider, MD   mupirocin (Bactroban) 2 % ointment Apply topically. 10/5/21 4/15/25  Historical Provider, MD     Allergies   Allergen Reactions    Adenosine Unknown     PATIENT STATES DRUG INEFFECTIVE IN EMERGENCY PER DR ERICKSON     Social History     Tobacco Use    Smoking status: Never    Smokeless tobacco: Never   Substance Use Topics    Alcohol use: Never         Chemistry    Lab Results   Component Value Date/Time     04/04/2025 1131    K 5.3 04/04/2025 1131     04/04/2025 1131    CO2 32 04/04/2025 1131    BUN 24 (H) 04/04/2025 1131    CREATININE 0.68 04/04/2025 1131    Lab Results   Component Value Date/Time    CALCIUM 9.6 04/04/2025 1131          Lab Results   Component Value Date/Time    WBC 7.4 04/04/2025 1131    HGB 13.0 04/04/2025 1131    HCT 41.5 04/04/2025 1131     04/04/2025 1131     No results found for: \"PROTIME\", \"PTT\", \"INR\"  Encounter Date: 04/04/25   ECG 12 Lead   Result Value    Ventricular Rate 55    Atrial Rate 55    " AK Interval 174    QRS Duration 86    QT Interval 452    QTC Calculation(Bazett) 432    P Axis 36    R Axis 16    T Axis 35    QRS Count 9    Q Onset 220    P Onset 133    P Offset 184    T Offset 446    QTC Fredericia 439    Narrative    Sinus bradycardia  Otherwise normal ECG  No previous ECGs available  Confirmed by Josse Otero ([id number]) on 4/9/2025 4:37:43 PM        Relevant Problems   No relevant active problems       Clinical information reviewed:   Tobacco  Allergies  Meds   Med Hx  Surg Hx   Fam Hx  Soc Hx        NPO Detail:  NPO/Void Status  Carbohydrate Drink Given Prior to Surgery? : N  Date of Last Liquid: 04/15/25  Time of Last Liquid: 0900 (sip with meds)  Date of Last Solid: 04/14/25  Time of Last Solid: 2355  Last Intake Type: Clear fluids  Time of Last Void: 1206         Physical Exam    Airway  Mallampati: II  TM distance: >3 FB     Cardiovascular - normal exam  Rhythm: regular  Rate: normal     Dental - normal exam     Pulmonary - normal exam     Abdominal - normal exam  Abdomen: soft             Anesthesia Plan    History of general anesthesia?: yes  History of complications of general anesthesia?: no    ASA 2     general     The patient is not a current smoker.  Patient was previously instructed to abstain from smoking on day of procedure.  Patient did not smoke on day of procedure.  Education provided regarding risk of obstructive sleep apnea.  intravenous induction   Postoperative administration of opioids is intended.  Anesthetic plan and risks discussed with patient.  Use of blood products discussed with patient who.

## 2025-04-15 NOTE — ANESTHESIA POSTPROCEDURE EVALUATION
Patient: Elsa Cabrera    Procedure Summary       Date: 04/15/25 Room / Location: [facility] OR 07 / Virtual [facility] OR    Anesthesia Start: 1228 Anesthesia Stop: 1553    Procedures:       sacrospinous ligament suspension      posterior repair      HYSTERECTOMY, VAGINAL, RIGHT SALPINGO-OOPHORECTOMY Diagnosis:       Midline cystocele      KEN (stress urinary incontinence, female)      (Midline cystocele [N81.11])      (KEN (stress urinary incontinence, female) [N39.3])    Surgeons: Rosalie Mederos MD Responsible Provider: Collin Yu DO    Anesthesia Type: general ASA Status: 2            Anesthesia Type: general    Vitals Value Taken Time   /77 04/15/25 1600   Temp 36.4 °C (97.5 °F) 04/15/25 1550   Pulse 60 04/15/25 1607   Resp 12 04/15/25 1607   SpO2 99 % 04/15/25 1607   Vitals shown include unfiled device data.    Anesthesia Post Evaluation    Patient location during evaluation: PACU  Patient participation: complete - patient participated  Level of consciousness: awake and alert  Pain management: adequate  Airway patency: patent  Cardiovascular status: acceptable  Respiratory status: acceptable  Hydration status: acceptable  Postoperative Nausea and Vomiting: none        No notable events documented.

## 2025-04-15 NOTE — DISCHARGE INSTRUCTIONS
"  UROGYNECOLOGY POST-OPERATIVE INSTRUCTIONS    FOOD:  You can eat your normal regular food.  There are no restrictions.     ACTIVITY  1. You will feel tired and weak because your body is focusing on healing.  2. We encourage you to walk. You will get tired quickly so take breaks when you need to. Then get up and move around again. It is important NOT to lie in bed all day. Being active throughout the recovery process is the best way to speed up your healing and avoid complications.   3. If you had vaginal surgery, it will hurt to sit.  Sit on \"one cheek\" or the other, use a soft cushion or sofa.  4. You can go outside the house.   5. You can go up and down the stairs.   6. No formal exercising (e.g. gym, treadmill, bike, weight-lifting, power walking etc) until cleared by Dr. Mederos  7. You can do light housework (e.g. dusting, light meals, very small loads of laundry)  8. Do not  things over 10 lbs (about the weight of one gallon of milk).  9. No driving for 1 week or while taking narcotic pain medication. You can drive once pain is improved and you feel you are able to comfortably push on the brakes (quickly if needed to avoid an accident) and navigate your vehicle.  10. You can SHOWER.      PAIN:  Unfortunately surgery hurts!  But there are lots of ways to control pain.    1) Ice packs or warm packs help a lot with pain!  You can use either, whichever feels better to you. If you don't have reusable ice packs at home, consider freezing some water bottles to place over the vaginal area. Apply ice for 20 minutes at a time.  2) Alternate between tylenol 1000 mg and ibuprofen 600 mg every 3 hours. So when you wake up, take tylenol 1000 mg, then three hours later take ibuprofen 600 mg, then three hours later tylenol 1000 mg, etc. This will ensure you always have some strong pain medicine in your system to help prevent pain. Follow this schedule for at least the first three days after surgery, then you can space " out the doses if you feel like your pain is minimal.     3) Save the narcotic pain medication (if you were given narcotics) for prior to bedtime.  Take it in the daytime ONLY if Tylenol XS, ibuprofen, and cold/warm packs are not controlling the pain.  4) Narcotics (such as oxycodone or dilaudid) cause constipation! If you are needing to take more than prescribed, please call and talk to Dr. Mederos's office.      CONSTIPATION: Avoiding constipation is key after surgery!  1) Take 1 capful of Miralax daily  2) If you have not moved your bowels within 48 hours or 2 days of surgery, increase frequency of Miralax to twice a day  3) The goal is to have toothpaste consistency stools. You can decrease the amount of Miralax if you are having diarrhea.  4) If you have not had a bowel movement 5 days after surgery, call Dr. Mederos's office.     WOUND CARE:  1.  If you had vaginal surgery, you will have light vaginal bleeding or discharge that will go on for almost 6 weeks.  You will need a light pad.  If you have heavy bleeding (enough to fully soak a pad in an hour or less), call Dr. Mederos's office. You may also notice some clots passing particularly if you just got up from being in bed or sitting for a while - this is normal!  2. If you had abdominal (robotic) surgery, just keep the incisions clean.  No creams or ointments or dressingsl  3. You may shower daily, no special soaps or cleansing agents.  4. Dr. Mederos may ask you to insert estrogen cream in the vagina using an applicator.  This is safe and will not hurt you in any way. Sometimes it can stir up a little bleeding, but it is generally not heavy.     CATHETER CARE (if you go home with a catheter):  1.  Simply empty the bag every 3-4 hours.  2. You can shower and let the catheter get wet, pat it dry with a towel.  3. Call Dr. Mederos's office to set up appointment for catheter removal.    REASONS TO CALL US  1. If you have a fever (temperature more than 100.3  degrees). Please check your temperature prior to calling.  2. If your pain is not controlled after taking the medications as recommended above.  3. If you are vomiting and unable to hold down food or liquid.  4. If you are unable to urinate despite having an urge to do so. Similarly, if you have a catheter in place and have a strong urge to urinate but are not seeing urine draining into the bag, give us a call.  5. If you are unable to have a bowel movement despite following the recommendations listed above.    If you ever feel that something isn't right or you have concerns, please don't hesitate to call our office!    HOW TO REACH US:  [place] patients: [phone]  [place] patients: [phone], option #2  [place] patients: [phone]     -----------------------  You were admitted to the hospital because of nausea and vomiting after the procedure.    New Medications:  None    Please continue taking your other home medications as prescribed.    For medication refills, please contact your primary care doctor.     Please call your primary care doctor in 2-3 days to schedule follow up appointment for this hospitalization and further management of your care.     If you get worse, or develop any concerning or worrisome symptoms call your Primary Care Doctor or return to the Emergency Department.     -[facility] [place] [facility] -Hospital medicine

## 2025-04-15 NOTE — ANESTHESIA PROCEDURE NOTES
Airway  Date/Time: 4/15/2025 12:36 PM  Urgency: elective      Staffing  Performed: МАРИЯ and attending   Authorized by: Qasim Sanchez DO    Performed by: Qasim Sanchez DO  Patient location during procedure: OR    Indications and Patient Condition  Indications for airway management: anesthesia  Spontaneous Ventilation: absent  Sedation level: deep  Preoxygenated: yes  Patient position: sniffing  Mask difficulty assessment: 0 - not attempted    Final Airway Details  Final airway type: endotracheal airway      Successful airway: ETT  Cuffed: yes   Successful intubation technique: video laryngoscopy  Endotracheal tube insertion site: oral  Blade size: #3  ETT size (mm): 7.0  Cormack-Lehane Classification: grade I - full view of glottis  Placement verified by: chest auscultation and capnometry   Measured from: lips  ETT to lips (cm): 21  Number of attempts at approach: 1  Ventilation between attempts: none  Number of other approaches attempted: 0

## 2025-04-15 NOTE — H&P
History Of Present Illness  Elsa Cabrera is a 69 y.o. female presented to [facility] as outpatient procedure  sacrospinous ligament suspension anterior and posterior repairs midurethral sling, cystoscopy vaginal hysterectomy and right salpingo-oophorectomy with Dr. Mederos [facility] gynecology today. Post operatively patient experiencing persistent nausea/vomiting while in PACU and after 4 hours of being in PACU and symptoms not resolved, PACU reached out to hospital medicine for observation admission to monitor and treat nausea/vomiting. Patient seen on arrival to medical floor, indicates still nauseated, appears drowsy post procedure, denies any pain or chief complaints.      Past Medical History  She has a past medical history of Arrhythmia, Arthritis, Breast cancer (2007), Cardiac murmur, Hypertension, Joint pain, Nephrolithiasis, SVT (supraventricular tachycardia) (CMS-HCC), and SVT (supraventricular tachycardia) (CMS-HCC).    Surgical History  She has a past surgical history that includes Other surgical history (10/06/2021); Breast lumpectomy (Left); Appendectomy; Dilation and curettage of uterus; Oophorectomy; Ablation of dysrhythmic focus; and Total vaginal hysterectomy (N/A, 04/15/2025).     Social History  She reports that she has never smoked. She has never used smokeless tobacco. She reports that she does not drink alcohol and does not use drugs.    Family History  Family History   Problem Relation Name Age of Onset    Hypertension Mother      Hypertension Father      Heart failure Father          Allergies  Adenosine    Review of Systems   Constitutional: Negative.    Respiratory: Negative.     Cardiovascular: Negative.    Gastrointestinal: Negative.    Genitourinary: Negative.    Musculoskeletal: Negative.    Neurological: Negative.    Psychiatric/Behavioral: Negative.        ROS: 12 systems reviewed and negative except per HPI above     Physical Exam by System:     Constitutional:  "Well developed, awake/alert/oriented x3, no distress, alert and cooperative   ENMT: mucous membranes dry   Head/Neck: Neck supple   Respiratory/Thorax: Patent airways, CTAB, normal breath sounds with good chest expansion, thorax symmetric   Cardiovascular: Regular, rate and rhythm, +murmurs, 2+ equal pulses of the extremities, normal S 1and S 2   Gastrointestinal: Nondistended, soft, non-tender, no rebound tenderness or guarding, no masses palpable, no organomegaly, +BS, no bruits   Musculoskeletal: ROM intact, no joint swelling, normal strength   Extremities: normal extremities, no cyanosis, plus 1 edema lower extremities, no contusions or wounds, no clubbing   Neurological: alert and oriented x3, intact senses, motor, response and reflexes, normal strength       Last Recorded Vitals  Blood pressure 144/74, pulse 60, temperature 36.4 °C (97.5 °F), resp. rate 13, height 1.575 m (5' 2\"), weight 107 kg (235 lb), SpO2 90%.    Relevant Results  No results found for this or any previous visit (from the past 24 hours).   Scheduled medications  acetaminophen, 975 mg, oral, Once  ceFAZolin, 2 g, intravenous, Once  lidocaine, 0.1 mL, subcutaneous, Once      Continuous medications  lactated Ringer's, 100 mL/hr      PRN medications  PRN medications: albuterol, diphenhydrAMINE, fentaNYL PF, hydrALAZINE, HYDROmorphone, HYDROmorphone, meperidine, midazolam, oxyCODONE, oxygen     Imaging  No results found.    Cardiology, Vascular, and Other Imaging  No other imaging results found for the past 2 days       Assessment/Plan   #Post op day 0 sacrospinous ligament suspension anterior and posterior repairs midurethral sling, cystoscopy vaginal hysterectomy and right salpingo-oophorectomy  #history of hypertension  #history of heart arrhythmia     Plan:    -Admit to observation with tele  -Per Dr. Mederos-pain control including Scheduled tylenol/Ibuprofen and oxy 5 mg PRN  -tylenol 650 mg every 4 hours schedule  -NS 75 ml hour  -Zofran, " Reglan PRN nausea/vomiting  -Void trial completed in PACU and can void freely overnight  -clear liquid diet  -am labs including cbc, cmp  -resume patient medications when med rec is reconciled  -dvtp: scd's  -POC dicussed with patient and attending  -Dispo- pending clinical course likely be able to discharge tomorrow to home     Code status: Full Code      I spent >40 minutes in the professional and overall care of this patient.    SIGNATURE: NYLA Mari-CNP PATIENT NAME: Elsa Cabrera   DATE: April 15, 2025 MRN: [id number]   TIME: 7:38 PM

## 2025-04-16 ENCOUNTER — PHARMACY VISIT (OUTPATIENT)
Dept: PHARMACY | Facility: CLINIC | Age: 70
End: 2025-04-16
Payer: COMMERCIAL

## 2025-04-16 VITALS
WEIGHT: 235 LBS | BODY MASS INDEX: 43.24 KG/M2 | RESPIRATION RATE: 16 BRPM | OXYGEN SATURATION: 95 % | TEMPERATURE: 98.1 F | HEART RATE: 78 BPM | SYSTOLIC BLOOD PRESSURE: 115 MMHG | HEIGHT: 62 IN | DIASTOLIC BLOOD PRESSURE: 59 MMHG

## 2025-04-16 LAB
ALBUMIN SERPL BCP-MCNC: 3.5 G/DL (ref 3.4–5)
ALP SERPL-CCNC: 31 U/L (ref 33–136)
ALT SERPL W P-5'-P-CCNC: 15 U/L (ref 7–45)
ANION GAP SERPL CALC-SCNC: 12 MMOL/L (ref 10–20)
AST SERPL W P-5'-P-CCNC: 24 U/L (ref 9–39)
BILIRUB SERPL-MCNC: 0.5 MG/DL (ref 0–1.2)
BUN SERPL-MCNC: 24 MG/DL (ref 6–23)
CALCIUM SERPL-MCNC: 8.2 MG/DL (ref 8.6–10.3)
CHLORIDE SERPL-SCNC: 106 MMOL/L (ref 98–107)
CO2 SERPL-SCNC: 25 MMOL/L (ref 21–32)
CREAT SERPL-MCNC: 0.67 MG/DL (ref 0.5–1.05)
EGFRCR SERPLBLD CKD-EPI 2021: >90 ML/MIN/1.73M*2
ERYTHROCYTE [DISTWIDTH] IN BLOOD BY AUTOMATED COUNT: 14.4 % (ref 11.5–14.5)
GLUCOSE SERPL-MCNC: 116 MG/DL (ref 74–99)
HCT VFR BLD AUTO: 35.4 % (ref 36–46)
HGB BLD-MCNC: 11.1 G/DL (ref 12–16)
MCH RBC QN AUTO: 29.3 PG (ref 26–34)
MCHC RBC AUTO-ENTMCNC: 31.4 G/DL (ref 32–36)
MCV RBC AUTO: 93 FL (ref 80–100)
NRBC BLD-RTO: 0 /100 WBCS (ref 0–0)
PLATELET # BLD AUTO: 189 X10*3/UL (ref 150–450)
POTASSIUM SERPL-SCNC: 4.3 MMOL/L (ref 3.5–5.3)
PROT SERPL-MCNC: 5.9 G/DL (ref 6.4–8.2)
RBC # BLD AUTO: 3.79 X10*6/UL (ref 4–5.2)
SODIUM SERPL-SCNC: 139 MMOL/L (ref 136–145)
WBC # BLD AUTO: 12.2 X10*3/UL (ref 4.4–11.3)

## 2025-04-16 PROCEDURE — 36415 COLL VENOUS BLD VENIPUNCTURE: CPT | Performed by: INTERNAL MEDICINE

## 2025-04-16 PROCEDURE — 80053 COMPREHEN METABOLIC PANEL: CPT | Performed by: INTERNAL MEDICINE

## 2025-04-16 PROCEDURE — 2500000001 HC RX 250 WO HCPCS SELF ADMINISTERED DRUGS (ALT 637 FOR MEDICARE OP)

## 2025-04-16 PROCEDURE — 99239 HOSP IP/OBS DSCHRG MGMT >30: CPT

## 2025-04-16 PROCEDURE — 7100000011 HC EXTENDED STAY RECOVERY HOURLY - NURSING UNIT

## 2025-04-16 PROCEDURE — RXMED WILLOW AMBULATORY MEDICATION CHARGE

## 2025-04-16 PROCEDURE — 2500000001 HC RX 250 WO HCPCS SELF ADMINISTERED DRUGS (ALT 637 FOR MEDICARE OP): Performed by: INTERNAL MEDICINE

## 2025-04-16 PROCEDURE — 2500000005 HC RX 250 GENERAL PHARMACY W/O HCPCS: Performed by: INTERNAL MEDICINE

## 2025-04-16 PROCEDURE — 85027 COMPLETE CBC AUTOMATED: CPT | Performed by: INTERNAL MEDICINE

## 2025-04-16 PROCEDURE — G0378 HOSPITAL OBSERVATION PER HR: HCPCS

## 2025-04-16 RX ORDER — AMLODIPINE BESYLATE 5 MG/1
5 TABLET ORAL DAILY
Status: DISCONTINUED | OUTPATIENT
Start: 2025-04-16 | End: 2025-04-16 | Stop reason: HOSPADM

## 2025-04-16 RX ORDER — CITALOPRAM 20 MG/1
20 TABLET, FILM COATED ORAL DAILY
Status: DISCONTINUED | OUTPATIENT
Start: 2025-04-16 | End: 2025-04-16 | Stop reason: HOSPADM

## 2025-04-16 RX ORDER — LOSARTAN POTASSIUM 50 MG/1
50 TABLET ORAL DAILY
Status: DISCONTINUED | OUTPATIENT
Start: 2025-04-16 | End: 2025-04-16 | Stop reason: HOSPADM

## 2025-04-16 RX ORDER — METOPROLOL TARTRATE 50 MG/1
50 TABLET ORAL 2 TIMES DAILY
Status: DISCONTINUED | OUTPATIENT
Start: 2025-04-16 | End: 2025-04-16 | Stop reason: HOSPADM

## 2025-04-16 RX ORDER — TRAMADOL HYDROCHLORIDE 50 MG/1
50 TABLET ORAL 3 TIMES DAILY PRN
Status: DISCONTINUED | OUTPATIENT
Start: 2025-04-16 | End: 2025-04-16 | Stop reason: HOSPADM

## 2025-04-16 RX ORDER — ONDANSETRON 4 MG/1
8 TABLET, FILM COATED ORAL EVERY 8 HOURS PRN
Qty: 20 TABLET | Refills: 0 | Status: SHIPPED | OUTPATIENT
Start: 2025-04-16

## 2025-04-16 RX ADMIN — ACETAMINOPHEN 325MG 650 MG: 325 TABLET ORAL at 00:39

## 2025-04-16 RX ADMIN — PANTOPRAZOLE SODIUM 40 MG: 40 TABLET, DELAYED RELEASE ORAL at 05:59

## 2025-04-16 RX ADMIN — AMLODIPINE BESYLATE 5 MG: 5 TABLET ORAL at 09:30

## 2025-04-16 RX ADMIN — ACETAMINOPHEN 325MG 650 MG: 325 TABLET ORAL at 12:30

## 2025-04-16 RX ADMIN — CITALOPRAM HYDROBROMIDE 20 MG: 20 TABLET ORAL at 09:29

## 2025-04-16 RX ADMIN — ACETAMINOPHEN 325MG 650 MG: 325 TABLET ORAL at 09:30

## 2025-04-16 RX ADMIN — Medication 3 MG: at 01:00

## 2025-04-16 RX ADMIN — ACETAMINOPHEN 325MG 650 MG: 325 TABLET ORAL at 04:30

## 2025-04-16 RX ADMIN — METOPROLOL TARTRATE 50 MG: 50 TABLET, FILM COATED ORAL at 09:29

## 2025-04-16 ASSESSMENT — COGNITIVE AND FUNCTIONAL STATUS - GENERAL
DAILY ACTIVITIY SCORE: 24
MOBILITY SCORE: 23
WALKING IN HOSPITAL ROOM: A LITTLE

## 2025-04-16 ASSESSMENT — PAIN DESCRIPTION - LOCATION
LOCATION: VAGINA
LOCATION: VAGINA
LOCATION: ABDOMEN

## 2025-04-16 ASSESSMENT — PAIN - FUNCTIONAL ASSESSMENT
PAIN_FUNCTIONAL_ASSESSMENT: 0-10

## 2025-04-16 ASSESSMENT — PAIN SCALES - GENERAL
PAINLEVEL_OUTOF10: 0 - NO PAIN
PAINLEVEL_OUTOF10: 3
PAINLEVEL_OUTOF10: 2
PAINLEVEL_OUTOF10: 4
PAINLEVEL_OUTOF10: 4
PAINLEVEL_OUTOF10: 3
PAINLEVEL_OUTOF10: 4
PAINLEVEL_OUTOF10: 0 - NO PAIN

## 2025-04-16 ASSESSMENT — PAIN DESCRIPTION - DESCRIPTORS
DESCRIPTORS: DULL
DESCRIPTORS: OTHER (COMMENT)

## 2025-04-16 ASSESSMENT — ACTIVITIES OF DAILY LIVING (ADL): LACK_OF_TRANSPORTATION: NO

## 2025-04-16 NOTE — CARE PLAN
The patient's goals for the shift include      The clinical goals for the shift include IV Fluids and to tolerate solid food

## 2025-04-16 NOTE — PROGRESS NOTES
04/16/25 1027   Discharge Planning   Living Arrangements Alone   Support Systems Children;Family members;Friends/neighbors   Assistance Needed none   Type of Residence Private residence   Number of Stairs to Enter Residence 1   Number of Stairs Within Residence 0   Home or Post Acute Services None   Expected Discharge Disposition Home   Does the patient need discharge transport arranged? No   Financial Resource Strain   How hard is it for you to pay for the very basics like food, housing, medical care, and heating? Not hard   Housing Stability   In the last 12 months, was there a time when you were not able to pay the mortgage or rent on time? N   At any time in the past 12 months, were you homeless or living in a shelter (including now)? N   Transportation Needs   In the past 12 months, has lack of transportation kept you from medical appointments or from getting medications? no   In the past 12 months, has lack of transportation kept you from meetings, work, or from getting things needed for daily living? No   Intensity of Service   Intensity of Service 0-30 min     Spoke to patient at bedside to explain my role in discharge planning. Patient states she lives at home alone, but her 3 daughters are not far. PCP is Antionette Yo. She uses a cane with ambulation and has a walker at home if needed. She uses [facility] in [place] for prescriptions. Patient feels she effectively manages her health at home and plans to return home when medically ready.

## 2025-04-16 NOTE — DISCHARGE SUMMARY
Discharge Diagnosis  Nausea & vomiting    Issues Requiring Follow-Up  - Follow-up with PCP for posthospitalization care.  -Follow-up with gynecology in 2 and 6 months postoperatively.    Discharge Meds     Medication List      START taking these medications     ondansetron 4 mg tablet; Commonly known as: Zofran; Take 2 tablets (8   mg) by mouth every 8 hours if needed for nausea or vomiting for up to 10   doses.     CONTINUE taking these medications     amLODIPine 5 mg tablet; Commonly known as: Norvasc   ascorbic acid 500 mg tablet; Commonly known as: Vitamin C   chlorhexidine 0.12 % solution; Commonly known as: Peridex; 15   milliliter(s) orally once a day for 2 doses 15 ml  the night before   surgery and 15 ml morning of surgery - swish for 30 seconds -DO NOT   SWALLOW, SPIT OUT   citalopram 20 mg tablet; Commonly known as: CeleXA   Claritin 10 mg tablet; Generic drug: loratadine   ferrous sulfate 325 (65 Fe) mg EC tablet   hydroCHLOROthiazide 25 mg tablet; Commonly known as: HYDRODiuril   losartan 50 mg tablet; Commonly known as: Cozaar   meloxicam 15 mg tablet; Commonly known as: Mobic   metoprolol tartrate 50 mg tablet; Commonly known as: Lopressor   multivitamin tablet   traMADol 50 mg tablet; Commonly known as: Ultram   Tylenol Extra Strength 500 mg tablet; Generic drug: acetaminophen   vit A,C and E-lutein-minerals 300 mcg-200 mg-27 mg-2 mg tablet; Commonly   known as: Ocuvite   ZINC ACETATE ORAL       Test Results Pending At Discharge  Pending Labs       Order Current Status    Surgical Pathology Exam In process            Hospital Course  Mrs. Cabrera is a 69-year-old with PMH hypertension, SVT, breast cancer who was admitted to medicine after she experienced nausea vomiting at PACU after she underwent total vaginal hysterectomy, right salpingo-oophorectomy, sacrospinous ligament fixation and posterior repair and perineorrhaphy.  Patient was treated with antibiotic medications and her symptoms improved.   Patient was able to regular diet without any problems.  During her hospital stay, she was clinically and vitally stable. Patient discharge to home in stable condition with need to follow-up with gynecology in 2 and 6 weeks postoperatively.    Pertinent Physical Exam At Time of Discharge  Physical Exam  Constitutional: A&Ox4, NAD, resting comfortable   Head and Face: Atraumatic, normocephalic   Eyes: Normal external exam, EOMI  ENT: Normal external inspection of ears and nose. Oropharynx normal.  Cardiovascular: RRR, S1/S2, no murmurs, rubs, or gallops, radial pulses +2  Pulmonary: CTAB, no respiratory distress, no wheezing, rales or rhonchi, on RA  Abdomen: +BS, soft, non-tender, nondistended, no guarding rigidity or rebound tenderness, no masses noted  MSK: Negative for edema, No joint swelling, normal movements of all extremities.   Neuro: No focal deficits, normal motor function, normal sensation, follows all commands  Skin- No lesions, contusions, or erythema.  Psychiatric: Judgment intact. Appropriate mood, affect and behavior    Outpatient Follow-Up  Future Appointments   Date Time Provider Department Center   5/29/2025  9:45 AM Rossy Simons MD [id number]         Kemar Murrell MD

## 2025-04-16 NOTE — CARE PLAN
The patient's goals for the shift include advance diet w/no nausea    The clinical goals for the shift include IV Fluids and to tolerate solid food

## 2025-04-28 ENCOUNTER — APPOINTMENT (OUTPATIENT)
Dept: OBSTETRICS AND GYNECOLOGY | Facility: CLINIC | Age: 70
End: 2025-04-28
Payer: MEDICARE

## 2025-04-28 VITALS
HEIGHT: 62 IN | WEIGHT: 232 LBS | DIASTOLIC BLOOD PRESSURE: 84 MMHG | BODY MASS INDEX: 42.69 KG/M2 | SYSTOLIC BLOOD PRESSURE: 126 MMHG

## 2025-04-28 DIAGNOSIS — Z09 POSTOP CHECK: Primary | ICD-10-CM

## 2025-04-28 DIAGNOSIS — N39.3 SUI (STRESS URINARY INCONTINENCE, FEMALE): ICD-10-CM

## 2025-04-28 PROCEDURE — 3008F BODY MASS INDEX DOCD: CPT | Performed by: STUDENT IN AN ORGANIZED HEALTH CARE EDUCATION/TRAINING PROGRAM

## 2025-04-28 PROCEDURE — 1036F TOBACCO NON-USER: CPT | Performed by: STUDENT IN AN ORGANIZED HEALTH CARE EDUCATION/TRAINING PROGRAM

## 2025-04-28 PROCEDURE — 99024 POSTOP FOLLOW-UP VISIT: CPT | Performed by: STUDENT IN AN ORGANIZED HEALTH CARE EDUCATION/TRAINING PROGRAM

## 2025-04-28 PROCEDURE — 1159F MED LIST DOCD IN RCRD: CPT | Performed by: STUDENT IN AN ORGANIZED HEALTH CARE EDUCATION/TRAINING PROGRAM

## 2025-04-28 PROCEDURE — 1126F AMNT PAIN NOTED NONE PRSNT: CPT | Performed by: STUDENT IN AN ORGANIZED HEALTH CARE EDUCATION/TRAINING PROGRAM

## 2025-04-28 ASSESSMENT — PAIN SCALES - GENERAL: PAINLEVEL_OUTOF10: 0-NO PAIN

## 2025-04-28 NOTE — PROGRESS NOTES
"POST OPERATIVE VISIT    The patient is a 69 y.o. female who presents for a postoperative follow up visit.    She underwent TVH, RSO, SSLF, cysto, MA on 4/15/25 and is now 2 week(s)  post-op.    Pathology:   FINAL DIAGNOSIS   A. UTERUS, CERVIX, RIGHT OVARY AND FALLOPIAN TUBE, TOTAL VAGINAL HYSTERECTOMY AND RIGHT SALPINGO-OOPHORECTOMY:  -- FOCAL ENDOMETRIAL HYPERPLASIA WITHOUT ATYPIA IN A BACKGROUND OF DISORDERED PROLIFERATIVE ENDOMETRIUM.  -- MYOMETRIUM WITH NO SIGNIFICANT PATHOLOGIC FINDINGS.   -- CERVIX WITH HYPERKERATOSIS.  -- RIGHT OVARY WITH SEROUS CYSTADENOMA.  -- RIGHT FALLOPIAN TUBE WITH NO SIGNIFICANT PATHOLOGIC FINDINGS.        INTERVAL HISTORY:  Doing very well.    Prolapse symptoms: No    Urinary Incontinence symptoms:       Stress incontinence: Yes, a little bit       Urgency: Yes, a little better than before surgery       Frequency: Yes       Urge incontinence: Yes    Voiding dysfunction symptoms: No    Defecatory symptoms: No    Fecal Incontinence: No    Pain: minimal    PHYSICAL EXAM:  /84   Ht 1.575 m (5' 2\")   Wt 105 kg (232 lb)   BMI 42.43 kg/m²   PVR (by ultrasound): 7 ml    General Appearance: well developed, good nutrition, well groomed, no deformities, normal habitus  Head: normocephalic, and atraumatic  Neck: symmetric, midline trachea,  full range of motion  Respiratory: no dyspnea, negative for intercostal retraction or uses of accessory muscles of respiration  Cardiovascular: peripheral pulses are normal, no swelling, edema or varicosities  Abdomen: Abdomen soft, non-tender, non-distended.    Pelvic:  CST:  positive  External genitalia: normal appearance, normal Bartholin's glands and [place]'s glands   Urethra: normal meatus, non-tender, no periurethral mass  Vaginal mucosa: normal, no granulation tissue, no strictures  Cervix surgically absent  Uterus surgically absent  Adnexae  negative, nontender, no mass  Atrophy   No    POP-Q (in supine position):  No significant " prolapse    Impression/plan:  69 y.o. female with history of POP who underwent TVH, RSO, SSLF, cysto, KS on 4/15/25.    Surgery/pathology discussed with patient: Yes. endometrial hyperplasia without atypia identified as above. Discussed this was likely the cause of PMB but now uterus has been removed so should resolve.  She expressed understanding and all questions and concerns were answered and addressed.     She is doing well postoperatively.    - She is healing normally  - Preoperative symptoms resolved  - Nothing in vagina for 6 weeks  - Return in 4 week(s)    KEN  - minimal by symptoms  - CST positive today  - not interested in intervention, counseled it may improve with healing, will monitor    Rosalie Mederos MD

## 2025-05-13 ENCOUNTER — APPOINTMENT (OUTPATIENT)
Dept: OBSTETRICS AND GYNECOLOGY | Facility: CLINIC | Age: 70
End: 2025-05-13
Payer: MEDICARE

## 2025-05-27 NOTE — PROGRESS NOTES
"POST OPERATIVE VISIT    The patient is a 69 y.o. female who presents for a postoperative follow up visit.    She underwent TVH, RSO, SSLF, cysto, CA on 4/15/25 and is now 6 week(s)  post-op.    Pathology: previously reviewed with patient    INTERVAL HISTORY:  Doing well.     Prolapse symptoms: No    Urinary Incontinence symptoms:       Stress incontinence: No       Urgency: No       Frequency: No       Urge incontinence: No    Voiding dysfunction symptoms: No    Defecatory symptoms: No    Fecal Incontinence: No    Pain: denies    PHYSICAL EXAM:  /84   Ht 1.575 m (5' 2\")   Wt 106 kg (233 lb)   BMI 42.62 kg/m²   PVR (by ultrasound):     General Appearance: well developed, good nutrition, well groomed, no deformities, normal habitus  Head: normocephalic, and atraumatic  Neck: symmetric, midline trachea,  full range of motion  Respiratory: no dyspnea, negative for intercostal retraction or uses of accessory muscles of respiration  Cardiovascular: peripheral pulses are normal, no swelling, edema or varicosities  Abdomen: Abdomen soft, non-tender, non-distended.    Pelvic:  CST:  negative  External genitalia: normal appearance, normal Bartholin's glands and [place]'s glands   Urethra: normal meatus, non-tender, no periurethral mass  Vaginal mucosa: normal, no granulation tissue, no strictures,  incisions well-healed,  no evidence of exposed mesh, SSLF sutures palpable  Cervix surgically absent  Uterus surgically absent  Adnexae  negative, nontender, no mass  Atrophy   No    POP-Q (in supine position):  No significant prolapse        Impression/plan:  69 y.o. female with history of POP who underwent  TVH, RSO, SSLF, cysto, CA on 4/15/25     Surgery/pathology discussed with patient: discussed at last POV    She is doing well postoperatively.    - She is healing normally  - Preoperative symptoms resolved  - Resume normal activities  - Return in 1 year(s)    KEN  - not bothersome, continue to monitor    Rosalie PRETTY" MD Rosa M

## 2025-05-29 ENCOUNTER — APPOINTMENT (OUTPATIENT)
Dept: OBSTETRICS AND GYNECOLOGY | Facility: CLINIC | Age: 70
End: 2025-05-29
Payer: MEDICARE

## 2025-06-02 ENCOUNTER — APPOINTMENT (OUTPATIENT)
Dept: OBSTETRICS AND GYNECOLOGY | Facility: CLINIC | Age: 70
End: 2025-06-02
Payer: MEDICARE

## 2025-06-02 VITALS
WEIGHT: 233 LBS | SYSTOLIC BLOOD PRESSURE: 120 MMHG | BODY MASS INDEX: 42.88 KG/M2 | HEIGHT: 62 IN | DIASTOLIC BLOOD PRESSURE: 84 MMHG

## 2025-06-02 DIAGNOSIS — Z09 POSTOP CHECK: Primary | ICD-10-CM

## 2025-06-02 DIAGNOSIS — N39.3 SUI (STRESS URINARY INCONTINENCE, FEMALE): ICD-10-CM

## 2025-06-02 PROCEDURE — 1126F AMNT PAIN NOTED NONE PRSNT: CPT | Performed by: STUDENT IN AN ORGANIZED HEALTH CARE EDUCATION/TRAINING PROGRAM

## 2025-06-02 PROCEDURE — 1159F MED LIST DOCD IN RCRD: CPT | Performed by: STUDENT IN AN ORGANIZED HEALTH CARE EDUCATION/TRAINING PROGRAM

## 2025-06-02 PROCEDURE — 3008F BODY MASS INDEX DOCD: CPT | Performed by: STUDENT IN AN ORGANIZED HEALTH CARE EDUCATION/TRAINING PROGRAM

## 2025-06-02 PROCEDURE — 1036F TOBACCO NON-USER: CPT | Performed by: STUDENT IN AN ORGANIZED HEALTH CARE EDUCATION/TRAINING PROGRAM

## 2025-06-02 PROCEDURE — 99024 POSTOP FOLLOW-UP VISIT: CPT | Performed by: STUDENT IN AN ORGANIZED HEALTH CARE EDUCATION/TRAINING PROGRAM

## 2025-06-02 ASSESSMENT — PAIN SCALES - GENERAL: PAINLEVEL_OUTOF10: 0-NO PAIN

## 2025-09-18 ENCOUNTER — APPOINTMENT (OUTPATIENT)
Dept: OBSTETRICS AND GYNECOLOGY | Facility: CLINIC | Age: 70
End: 2025-09-18
Payer: MEDICARE

## 2025-09-25 ENCOUNTER — APPOINTMENT (OUTPATIENT)
Dept: OBSTETRICS AND GYNECOLOGY | Facility: CLINIC | Age: 70
End: 2025-09-25
Payer: MEDICARE

## 2026-06-01 ENCOUNTER — APPOINTMENT (OUTPATIENT)
Dept: OBSTETRICS AND GYNECOLOGY | Facility: CLINIC | Age: 71
End: 2026-06-01
Payer: MEDICARE

## (undated) DEVICE — NEEDLE, TAPER, MAYO, 0.5 CIRCLE, DISPOSABLE, 6

## (undated) DEVICE — Device

## (undated) DEVICE — SUTURE, PDS II, 2-0, 27 IN, SH, VIOLET

## (undated) DEVICE — DRAPE, UNDERBUTTOCKS, W/FLUID CONTROL POUCH

## (undated) DEVICE — IRRIGATION SET, CYSTOSCOPY, TURP, Y, CONTINUOUS, 81 IN

## (undated) DEVICE — TUBING, SUCTION, CONNECTING, 9/32 X 10FT, LF

## (undated) DEVICE — PREP, SKIN, BACTOSHEILD, 4%, 4OZ

## (undated) DEVICE — PANTY, MESH, LARGE

## (undated) DEVICE — GAUZE, X-RAY, RF DETECTABLE, 16 PLY, 4 X 4IN, STERILE

## (undated) DEVICE — GOWN, ASTOUND, L

## (undated) DEVICE — ACCESSORY, AVETA, WASTE MANAGEMENT WITH CAP

## (undated) DEVICE — TRAY, SURESTEP, SILICONE DRAINAGE BAG, STATLOCK, 16FR

## (undated) DEVICE — ACCESSORY, FLUID MANAGEMENT, AVETA

## (undated) DEVICE — SUTURE, VICRYL, 2-0, 27 IN, SH, UNDYED

## (undated) DEVICE — SUTURE, MONODEK, ABSORBABLE, DBL-ARMED, 1/2 CIRCLE, TAPER

## (undated) DEVICE — GLOVE, SURGICAL, PROTEXIS PI BLUE W/NEUTHERA, 6.5, PF, LF

## (undated) DEVICE — CONTAINER, SPECIMEN, 120 ML, STERILE

## (undated) DEVICE — SOLUTION, IRRIGATION, STERILE WATER, 1000 ML, POUR BOTTLE

## (undated) DEVICE — STAY SET, SURGICAL , 5MM SHARP HOOK, CS/ 50

## (undated) DEVICE — SOLUTION, IRRIGATION, SODIUM CHLORIDE 0.9%, 1000 ML, POUR BOTTLE

## (undated) DEVICE — DEVICE, SUTURE, CAPIO SLIM

## (undated) DEVICE — DRAPE, PAD, PREP, W/ 9 IN CUFF, 24 X 41, LF, NS

## (undated) DEVICE — GLOVE, SURGICAL, PROTEXIS PI , 6.0, PF, LF

## (undated) DEVICE — DEVICE, RESECTING, SMOL,  WITH HANDSET AVETA, 2.9MM

## (undated) DEVICE — PREP TRAY, VAGINAL

## (undated) DEVICE — DRESSING, NON-ADHERENT, TELFA, OUCHLESS, 3 X 8 IN, STERILE

## (undated) DEVICE — DRAPE PACK, LAVH, W/ATTACHED LEGGINGS, W/POUCH, 100 X 114 IN, LF, STERILE

## (undated) DEVICE — PAD, POST, PERINEAL, ADULT

## (undated) DEVICE — PREP TRAY, SKIN, DRY, W/GLOVES

## (undated) DEVICE — GLOVE, SURGICAL, PROTEXIS PI , 6.5, PF, LF

## (undated) DEVICE — TOWEL PACK, STERILE, 4/PACK, BLUE

## (undated) DEVICE — TIP, SUCTION, YANKAUER, FLEXIBLE

## (undated) DEVICE — RETRACTOR, SURGICAL, RING, PLASTIC, DISPOSABLE

## (undated) DEVICE — DRAPE, UNDERBUTTOCKS